# Patient Record
Sex: FEMALE | Race: WHITE | NOT HISPANIC OR LATINO | Employment: FULL TIME | URBAN - METROPOLITAN AREA
[De-identification: names, ages, dates, MRNs, and addresses within clinical notes are randomized per-mention and may not be internally consistent; named-entity substitution may affect disease eponyms.]

---

## 2017-02-17 ENCOUNTER — HOSPITAL ENCOUNTER (OUTPATIENT)
Dept: MAMMOGRAPHY | Facility: HOSPITAL | Age: 42
Discharge: HOME | End: 2017-02-17
Attending: OBSTETRICS & GYNECOLOGY
Payer: COMMERCIAL

## 2017-02-17 DIAGNOSIS — Z12.31 ENCOUNTER FOR SCREENING MAMMOGRAM FOR MALIGNANT NEOPLASM OF BREAST: ICD-10-CM

## 2017-02-17 PROCEDURE — G0202 SCR MAMMO BI INCL CAD: HCPCS

## 2017-06-03 ENCOUNTER — HOSPITAL ENCOUNTER (OUTPATIENT)
Dept: RADIOLOGY | Age: 42
Discharge: HOME | End: 2017-06-03
Admitting: FAMILY MEDICINE
Payer: COMMERCIAL

## 2017-06-03 ENCOUNTER — TRANSCRIBE ORDERS (OUTPATIENT)
Dept: URGENT CARE | Age: 42
End: 2017-06-03

## 2017-06-03 ENCOUNTER — OFFICE VISIT (OUTPATIENT)
Dept: URGENT CARE | Age: 42
End: 2017-06-03
Payer: COMMERCIAL

## 2017-06-03 DIAGNOSIS — M79.671 PAIN OF RIGHT FOOT: ICD-10-CM

## 2017-06-03 PROCEDURE — 99203 OFFICE O/P NEW LOW 30 MIN: CPT | Performed by: FAMILY MEDICINE

## 2017-06-03 PROCEDURE — 73630 X-RAY EXAM OF FOOT: CPT

## 2017-07-17 ENCOUNTER — ALLSCRIPTS OFFICE VISIT (OUTPATIENT)
Dept: OTHER | Facility: OTHER | Age: 42
End: 2017-07-17

## 2017-07-17 DIAGNOSIS — Z00.00 ENCOUNTER FOR GENERAL ADULT MEDICAL EXAMINATION WITHOUT ABNORMAL FINDINGS: ICD-10-CM

## 2017-07-17 DIAGNOSIS — I26.99 OTHER PULMONARY EMBOLISM WITHOUT ACUTE COR PULMONALE (HCC): ICD-10-CM

## 2017-07-17 DIAGNOSIS — E66.9 OBESITY: ICD-10-CM

## 2017-07-19 ENCOUNTER — APPOINTMENT (OUTPATIENT)
Dept: LAB | Age: 42
End: 2017-07-19
Payer: COMMERCIAL

## 2017-07-19 ENCOUNTER — GENERIC CONVERSION - ENCOUNTER (OUTPATIENT)
Dept: OTHER | Facility: OTHER | Age: 42
End: 2017-07-19

## 2017-07-19 ENCOUNTER — TRANSCRIBE ORDERS (OUTPATIENT)
Dept: ADMINISTRATIVE | Age: 42
End: 2017-07-19

## 2017-07-19 DIAGNOSIS — I26.99 PULMONARY INFARCTION (HCC): Primary | ICD-10-CM

## 2017-07-19 DIAGNOSIS — E66.9 OBESITY: ICD-10-CM

## 2017-07-19 DIAGNOSIS — Z00.00 ENCOUNTER FOR GENERAL ADULT MEDICAL EXAMINATION WITHOUT ABNORMAL FINDINGS: ICD-10-CM

## 2017-07-19 LAB
CHOLEST SERPL-MCNC: 233 MG/DL (ref 50–200)
GLUCOSE P FAST SERPL-MCNC: 93 MG/DL (ref 65–99)
HDLC SERPL-MCNC: 48 MG/DL (ref 40–60)
LDLC SERPL CALC-MCNC: 165 MG/DL (ref 0–100)
TRIGL SERPL-MCNC: 98 MG/DL
TSH SERPL DL<=0.05 MIU/L-ACNC: 1.23 UIU/ML (ref 0.36–3.74)

## 2017-07-19 PROCEDURE — 80061 LIPID PANEL: CPT

## 2017-07-19 PROCEDURE — 36415 COLL VENOUS BLD VENIPUNCTURE: CPT

## 2017-07-19 PROCEDURE — 84443 ASSAY THYROID STIM HORMONE: CPT

## 2017-07-19 PROCEDURE — 82947 ASSAY GLUCOSE BLOOD QUANT: CPT

## 2017-07-25 ENCOUNTER — GENERIC CONVERSION - ENCOUNTER (OUTPATIENT)
Dept: OTHER | Facility: OTHER | Age: 42
End: 2017-07-25

## 2017-07-26 ENCOUNTER — GENERIC CONVERSION - ENCOUNTER (OUTPATIENT)
Dept: OTHER | Facility: OTHER | Age: 42
End: 2017-07-26

## 2017-07-28 ENCOUNTER — APPOINTMENT (OUTPATIENT)
Dept: LAB | Facility: CLINIC | Age: 42
End: 2017-07-28
Payer: COMMERCIAL

## 2017-07-28 ENCOUNTER — GENERIC CONVERSION - ENCOUNTER (OUTPATIENT)
Dept: OTHER | Facility: OTHER | Age: 42
End: 2017-07-28

## 2017-07-28 DIAGNOSIS — I26.99 OTHER PULMONARY EMBOLISM WITHOUT ACUTE COR PULMONALE (HCC): ICD-10-CM

## 2017-07-28 LAB
ALBUMIN SERPL BCP-MCNC: 3.4 G/DL (ref 3.5–5)
ALP SERPL-CCNC: 89 U/L (ref 46–116)
ALT SERPL W P-5'-P-CCNC: 41 U/L (ref 12–78)
ANION GAP SERPL CALCULATED.3IONS-SCNC: 9 MMOL/L (ref 4–13)
AST SERPL W P-5'-P-CCNC: 30 U/L (ref 5–45)
BASOPHILS # BLD AUTO: 0.04 THOUSANDS/ΜL (ref 0–0.1)
BASOPHILS NFR BLD AUTO: 1 % (ref 0–1)
BILIRUB SERPL-MCNC: 0.28 MG/DL (ref 0.2–1)
BUN SERPL-MCNC: 12 MG/DL (ref 5–25)
CALCIUM SERPL-MCNC: 9.7 MG/DL (ref 8.3–10.1)
CHLORIDE SERPL-SCNC: 102 MMOL/L (ref 100–108)
CO2 SERPL-SCNC: 26 MMOL/L (ref 21–32)
CREAT SERPL-MCNC: 0.85 MG/DL (ref 0.6–1.3)
EOSINOPHIL # BLD AUTO: 0.23 THOUSAND/ΜL (ref 0–0.61)
EOSINOPHIL NFR BLD AUTO: 3 % (ref 0–6)
ERYTHROCYTE [DISTWIDTH] IN BLOOD BY AUTOMATED COUNT: 12.1 % (ref 11.6–15.1)
GFR SERPL CREATININE-BSD FRML MDRD: 85 ML/MIN/1.73SQ M
GLUCOSE P FAST SERPL-MCNC: 88 MG/DL (ref 65–99)
HCT VFR BLD AUTO: 41.8 % (ref 34.8–46.1)
HCYS SERPL-SCNC: 9 UMOL/L (ref 3.7–11.2)
HGB BLD-MCNC: 13.5 G/DL (ref 11.5–15.4)
LYMPHOCYTES # BLD AUTO: 1.83 THOUSANDS/ΜL (ref 0.6–4.47)
LYMPHOCYTES NFR BLD AUTO: 21 % (ref 14–44)
MCH RBC QN AUTO: 30 PG (ref 26.8–34.3)
MCHC RBC AUTO-ENTMCNC: 32.3 G/DL (ref 31.4–37.4)
MCV RBC AUTO: 93 FL (ref 82–98)
MONOCYTES # BLD AUTO: 0.62 THOUSAND/ΜL (ref 0.17–1.22)
MONOCYTES NFR BLD AUTO: 7 % (ref 4–12)
NEUTROPHILS # BLD AUTO: 6.07 THOUSANDS/ΜL (ref 1.85–7.62)
NEUTS SEG NFR BLD AUTO: 68 % (ref 43–75)
NRBC BLD AUTO-RTO: 0 /100 WBCS
PLATELET # BLD AUTO: 501 THOUSANDS/UL (ref 149–390)
PMV BLD AUTO: 9.4 FL (ref 8.9–12.7)
POTASSIUM SERPL-SCNC: 3.9 MMOL/L (ref 3.5–5.3)
PROT SERPL-MCNC: 8.5 G/DL (ref 6.4–8.2)
RBC # BLD AUTO: 4.5 MILLION/UL (ref 3.81–5.12)
SODIUM SERPL-SCNC: 137 MMOL/L (ref 136–145)
WBC # BLD AUTO: 8.81 THOUSAND/UL (ref 4.31–10.16)

## 2017-07-28 PROCEDURE — 86147 CARDIOLIPIN ANTIBODY EA IG: CPT

## 2017-07-28 PROCEDURE — 81241 F5 GENE: CPT

## 2017-07-28 PROCEDURE — 81240 F2 GENE: CPT

## 2017-07-28 PROCEDURE — 83090 ASSAY OF HOMOCYSTEINE: CPT

## 2017-07-28 PROCEDURE — 85025 COMPLETE CBC W/AUTO DIFF WBC: CPT

## 2017-07-28 PROCEDURE — 80053 COMPREHEN METABOLIC PANEL: CPT

## 2017-07-28 PROCEDURE — 36415 COLL VENOUS BLD VENIPUNCTURE: CPT

## 2017-07-28 PROCEDURE — 85301 ANTITHROMBIN III ANTIGEN: CPT

## 2017-07-28 PROCEDURE — 85303 CLOT INHIBIT PROT C ACTIVITY: CPT

## 2017-07-28 PROCEDURE — 85306 CLOT INHIBIT PROT S FREE: CPT

## 2017-07-29 LAB
CARDIOLIPIN IGA SER IA-ACNC: <9 APL U/ML (ref 0–11)
CARDIOLIPIN IGG SER IA-ACNC: <9 GPL U/ML (ref 0–14)
CARDIOLIPIN IGM SER IA-ACNC: 9 MPL U/ML (ref 0–12)

## 2017-07-31 LAB
AT III AG ACT/NOR PPP IA: 83 % (ref 72–124)
PROT C AG ACT/NOR PPP IA: >150 % OF NORMAL (ref 60–150)
PROT S ACT/NOR PPP: >259 % (ref 63–140)

## 2017-08-03 LAB
COMMENT: ABNORMAL
F5 GENE MUT ANL BLD/T: ABNORMAL

## 2017-08-04 ENCOUNTER — GENERIC CONVERSION - ENCOUNTER (OUTPATIENT)
Dept: OTHER | Facility: OTHER | Age: 42
End: 2017-08-04

## 2017-08-04 LAB
F2 GENE MUT ANL BLD/T: NORMAL
Lab: NORMAL

## 2017-08-11 ENCOUNTER — ALLSCRIPTS OFFICE VISIT (OUTPATIENT)
Dept: OTHER | Facility: OTHER | Age: 42
End: 2017-08-11

## 2017-10-09 ENCOUNTER — APPOINTMENT (OUTPATIENT)
Dept: LAB | Facility: CLINIC | Age: 42
End: 2017-10-09
Payer: COMMERCIAL

## 2017-10-09 DIAGNOSIS — I26.99 OTHER PULMONARY EMBOLISM WITHOUT ACUTE COR PULMONALE (HCC): ICD-10-CM

## 2017-10-09 LAB
ANION GAP SERPL CALCULATED.3IONS-SCNC: 6 MMOL/L (ref 4–13)
BASOPHILS # BLD AUTO: 0.04 THOUSANDS/ΜL (ref 0–0.1)
BASOPHILS NFR BLD AUTO: 1 % (ref 0–1)
BUN SERPL-MCNC: 11 MG/DL (ref 5–25)
CALCIUM SERPL-MCNC: 9.3 MG/DL (ref 8.3–10.1)
CHLORIDE SERPL-SCNC: 104 MMOL/L (ref 100–108)
CO2 SERPL-SCNC: 28 MMOL/L (ref 21–32)
CREAT SERPL-MCNC: 0.77 MG/DL (ref 0.6–1.3)
EOSINOPHIL # BLD AUTO: 0.32 THOUSAND/ΜL (ref 0–0.61)
EOSINOPHIL NFR BLD AUTO: 4 % (ref 0–6)
ERYTHROCYTE [DISTWIDTH] IN BLOOD BY AUTOMATED COUNT: 13.4 % (ref 11.6–15.1)
GFR SERPL CREATININE-BSD FRML MDRD: 96 ML/MIN/1.73SQ M
GLUCOSE P FAST SERPL-MCNC: 101 MG/DL (ref 65–99)
HCT VFR BLD AUTO: 41 % (ref 34.8–46.1)
HGB BLD-MCNC: 13.4 G/DL (ref 11.5–15.4)
LYMPHOCYTES # BLD AUTO: 2.34 THOUSANDS/ΜL (ref 0.6–4.47)
LYMPHOCYTES NFR BLD AUTO: 30 % (ref 14–44)
MCH RBC QN AUTO: 29.6 PG (ref 26.8–34.3)
MCHC RBC AUTO-ENTMCNC: 32.7 G/DL (ref 31.4–37.4)
MCV RBC AUTO: 91 FL (ref 82–98)
MONOCYTES # BLD AUTO: 0.74 THOUSAND/ΜL (ref 0.17–1.22)
MONOCYTES NFR BLD AUTO: 10 % (ref 4–12)
NEUTROPHILS # BLD AUTO: 4.33 THOUSANDS/ΜL (ref 1.85–7.62)
NEUTS SEG NFR BLD AUTO: 55 % (ref 43–75)
PLATELET # BLD AUTO: 337 THOUSANDS/UL (ref 149–390)
PMV BLD AUTO: 10.1 FL (ref 8.9–12.7)
POTASSIUM SERPL-SCNC: 4.3 MMOL/L (ref 3.5–5.3)
RBC # BLD AUTO: 4.53 MILLION/UL (ref 3.81–5.12)
SODIUM SERPL-SCNC: 138 MMOL/L (ref 136–145)
WBC # BLD AUTO: 7.77 THOUSAND/UL (ref 4.31–10.16)

## 2017-10-09 PROCEDURE — 36415 COLL VENOUS BLD VENIPUNCTURE: CPT

## 2017-10-09 PROCEDURE — 85025 COMPLETE CBC W/AUTO DIFF WBC: CPT

## 2017-10-09 PROCEDURE — 80048 BASIC METABOLIC PNL TOTAL CA: CPT

## 2017-10-20 ENCOUNTER — ALLSCRIPTS OFFICE VISIT (OUTPATIENT)
Dept: OTHER | Facility: OTHER | Age: 42
End: 2017-10-20

## 2017-10-21 NOTE — PROGRESS NOTES
Assessment  1  Factor V Leiden mutation (289 81) (D68 51)   2  Pulmonary embolism (415 19) (I26 99)    Plan  DM (diabetes mellitus screen), Factor V Leiden mutation, Obesity (BMI 30-39 9), Pulmonary  embolism, Screening for hyperlipidemia    · (1) CBC/PLT/DIFF; Status:Active; Requested for:93Iex3715;    · (1) COMPREHENSIVE METABOLIC PANEL; Status:Active; Requested for:85Nwt2620;    · (1) LIPID PANEL FASTING W DIRECT LDL REFLEX; Status:Active; Requested for:92Ukt2368; Discussion/Summary  Discussion Summary:   Doing well on Xareltoin July for wellness  Counseling Documentation With Imm: The patient was counseled regarding impressions  Medication SE Review and Pt Understands Tx: Possible side effects of new medications were reviewed with the patient/guardian today  The treatment plan was reviewed with the patient/guardian  The patient/guardian understands and agrees with the treatment plan      Chief Complaint  Chief Complaint Free Text Note Form: The patient presents today to discuss follow up on [address] [place] [address] [place]: Patient is here today for follow up of chronic conditions described in HPI  History of Present Illness  HPI: Factor V diagnosed when she had a PE in [address] [place] [address] on Xareltobruisingseem to be slightly heavier and a day longeroccasionally pain in the left back but not needing the muscle relaxantcbc bmp normal       Review of Systems  Complete-Female:   Constitutional: no fever-- and-- no chills  Cardiovascular: no chest pain-- and-- no palpitations  Respiratory: no shortness of breath-- and-- no cough  Gastrointestinal: no abdominal pain,-- no constipation,-- no diarrhea-- and-- no blood in stools  Integumentary: scaly patch on the left elbow  Active Problems  1  Bee sting allergy (V15 06) (Z91 038)   2  History of Bereavement (V62 82) (Z63 4)   3  DM (diabetes mellitus screen) (V77 1) (Z13 1)   4   Encounter for gynecological examination without abnormal finding (V72 31) (Z01 419)   5  Encounter for routine gynecological examination (V72 31) (Z01 419)   6  Encounter for screening mammogram for malignant neoplasm of breast (V76 12) (Z12 31)   7  Factor V Leiden mutation (289 81) (D68 51)   8  Laboratory examination ordered as part of a routine general medical examination (V72 62) (Z00 00)   9  History of Marital conflict (Z70 82) (J13 5)   10  Obesity (BMI 30-39 9) (278 00) (E66 9)   11  History of Oral contraceptive prescribed (V25 01) (Z30 011)   12  Pulmonary embolism (415 19) (I26 99)   13  Screening for human papillomavirus (HPV) (V73 81) (Z11 51)   14  Screening for hyperlipidemia (V77 91) (E14 254)    Past Medical History  1  History of , threatened, early pregnancy (640 03) (O20 0)   2  History of Acute tonsillitis (463) (J03 90)   3  History of Age At First Period 15 Years Old (Menarche)   4  History of Embryonic demise (632) (O02 1)   5  History of acute pharyngitis (V12 69) (Z87 09)   6  History of fever (V13 89) (Z87 898)   7  History of migraine (V12 49) (Z86 69)   8  History of Major depressive disorder, single episode, moderate with peripartum onset (296 22)   (F32 1)   9  Normal delivery (650) (O80,Z37 9)   10  History of Oral contraceptive prescribed (V25 01) (Z30 011)   11  History of Pain in right foot (729 5) (M79 671)   12  History of Type B influenza (487 1) (J10 1)  Active Problems And Past Medical History Reviewed: The active problems and past medical history were reviewed and updated today  Surgical History  1  Denied: History Of Prior Surgery  Surgical History Reviewed: The surgical history was reviewed and updated today  Family History  Mother    1  Family history of Hypertension (V17 49)  Father    2  Family history of Colon Cancer (V16 0)   3  Family history of Colon Cancer (V16 0)  Maternal Grandfather    4  Family history of Diabetes Mellitus (V18 0)  Family History    5   Family history of Colon Cancer (V16 0) 6  Family history of Heart Disease (V17 49)  Family History Reviewed: The family history was reviewed and updated today  Social History   · Being A Social Drinker   · History of Bereavement (V62 82) (Z63 4)   · Daily Tea Consumption (3  Cups/Day)   · Denied: History of Drug Use   · History of Marital conflict (G33 85) (M67 9)   · Never A Smoker  Social History Reviewed: The social history was reviewed and updated today  Current Meds   1  EpiPen 2-Adama 0 3 MG/0 3ML Injection Solution Auto-injector; 0 3mg IM in thigh prn anaphylaxis call   911; Therapy: 74SVV6440 to (Evaluate:30Apr2016)  Requested for: 28Jbt6517; Last Rx:75Wqd7383   Ordered   2  Xarelto 20 MG Oral Tablet; take 1 tablet by mouth every day; Therapy: 47AIY2269 to (Last Rx:79Tfg2544)  Requested for: 61Dkj5721 Ordered  Medication List Reviewed: The medication list was reviewed and updated today  Allergies  1  No Known Drug Allergies  2  Bee sting    Vitals  Vital Signs    Recorded: 38KOA7022 10:12AM   Temperature 98 4 F, Oral   Heart Rate 64   Respiration 18   Systolic 232, Sitting   Diastolic 80, Sitting   O2 Saturation 99     Physical Exam    Constitutional   General appearance: No acute distress, well appearing and well nourished  obese  Eyes   Conjunctiva and lids: No swelling, erythema or discharge  Ears, Nose, Mouth, and Throat   Otoscopic examination: Tympanic membranes translucent with normal light reflex  Canals patent without erythema  Oropharynx: Normal with no erythema, edema, exudate or lesions  Pulmonary   Respiratory effort: No increased work of breathing or signs of respiratory distress  Auscultation of lungs: Clear to auscultation  Cardiovascular   Auscultation of heart: Normal rate and rhythm, normal S1 and S2, without murmurs  Abdomen   Abdomen: Non-tender, no masses  Liver and spleen: No hepatomegaly or splenomegaly  Lymphatic   Palpation of lymph nodes in neck: No lymphadenopathy  Musculoskeletal   Gait and station: Normal     Psychiatric   Orientation to person, place, and time: Normal     Mood and affect: Normal          Results/Data  PHQ-2 Adult Depression Screening 20Oct2017 10:13AM User, Ahs     Test Name Result Flag Reference   PHQ-2 Adult Depression Score 0     Over the last two weeks, how often have you been bothered by any of the following problems? Little interest or pleasure in doing things: Not at all - 0  Feeling down, depressed, or hopeless: Not at all - 0   PHQ-2 Adult Depression Screening Negative         Health Management  Screening for human papillomavirus (HPV)   (1) THIN PREP PAP WITH IMAGING; every 3 years; Last 99Xeh2764; Next Due: 88Jfo0519; Overdue    Future Appointments    Date/Time Provider Specialty Site   11/10/2017 10:00 AM Shalini Mar DO Obstetrics/Gynecology [facility]   07/19/2018 10:00 AM LETTY Hutson  Internal Medicine [facility]   08/03/2018 10:30 AM LETTY Hutson   Internal Medicine [facility]     Signatures   Electronically signed by : LETTY Ruvalcaba ; Oct 20 2017 10:29AM EST                       (Author)

## 2017-11-10 ENCOUNTER — GENERIC CONVERSION - ENCOUNTER (OUTPATIENT)
Dept: OTHER | Facility: OTHER | Age: 42
End: 2017-11-10

## 2018-01-09 NOTE — RESULT NOTES
Verified Results  (1) THROAT CULTURE (CULTURE, UPPER RESPIRATORY) 47UMS5582 01:17PM Billy Lesser Order Number: [id number]     Test Name Result Flag Reference   CLINICAL REPORT (Report)     Test:        Throat culture  Specimen Source:  Throat  Specimen Type:   Throat  Specimen Date:   5/23/2016 1:17 PM  Result Date:    5/24/2016 2:53 PM  Result Status:   Final result  Resulting Lab:   [place] [address]            Tel: [phone]      CULTURE                                       ------------------                                   3+ Growth of Beta Hemolytic Streptococcus Group G     *** This organism is intrinisically susceptible to Penicillin  If sensitivites to other antibiotics are required, please call the      Microbiology Department at [phone] within 5 days  ***       Discussion/Summary   Discussed results with patent  She is improving  Advised her to continue the amoxicillin until completion  She states understanding

## 2018-01-11 NOTE — PSYCH
Progress Note  Psychotherapy Provided [facility]: Family Therapy provided today  Goals addressed in session:   Goal #1   D: Paulette: " I was talking to my doctor"   "I think often of the loss of my baby"   (crying)   " If I had my strongest desire, it would be to have another baby "  Lajean Cassette   " One of my doctors told me his wife had a baby at age 43, everything was fine, and that gave me some hope   but I know my  Ace Harrison, (here with her), could not agree with my wishes   that is hopeless "  Lajean Cassette  "I have low energy, I don't have any interest in doing the housework, because 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, 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 Overtime, too, I work 11am to 11pm  I procrastinate  Lajean Cassette Lajean Cassette I only put a few Michael decorations up ,I didn't feel like it, I was sad about losing the baby last year  "    Alli: " I do feel for her, and I'm trying to be supportive of her, but we haven't directly talked about getting any permanent surgery, either vasectomy for me, or tubal for her, and I do want to talk about things"   " I get my own depression and irritability"    "No , I'm not on meds "  Lajean Cassette  " And I worry very much, about our finances   we are slowly paying off some debts, and I never want to be in the hole we were in , again  "  Lajean Cassette " I'm trying to pay off a second mortgage, a home equity loan, and credit card bills "   " We have 3 sons, ages 15, 8, and 9, and we both love to take care of them and do things with them---we all really enjoyed going to [place] [address], and we also took the kids to [facility]---they loved it "  Lajean Cassette   " I couldn't afford that,with another baby "    Pt (Paulette) and  (Andrei Mata) participate in a Couples Therapy session  Pt has a sad , anxious, depressed, tearful affect  Mood is slightly depressed, only a 5 on the PHQ-9 scale  Pt denies Altagracia Sports / Westford Soren / Adilene Creamer / VH   Ace Harrison has mild depression ,also, and he also has no evidence of SI / HI / Adilene Creamer / VH    gets irritability at home, they both acknowledge  Pt gets bouts of tearfulness, often  Pt Paulette is on Lexapro 10mg---I highly recommend she speak to her Family Physician re; increased dose to 20 mg a day  I encourage  Louisa Macdonald to speak to HIS doctor also, about antidepressant medication  psychoeducation is given  They verbalize and process their thoughts, feelings, and behaviors  They process their grief and loss, re: pt's miscarriage on March 19, 2015  Grief Therapy, validation, and support given  Pt loves her  and 3 boys, but she would love to have a baby daughter, or even another baby son, before she has menopause   says "no", as the financial and emotional stress and worry on him would be too much, and he wants to comfortably provide for the 3 children they have,without overwhelming debt in the future, re; the childrens' activities, college educations, and the couple's own needs  Pt and  have only rare sex, as  is afraid his wife will get pregnant unexpectedly  Birth control is sketchy  We review the pros and cons of their having another baby  Their homework is to make a List of the Pros and the Cons , of having another baby, and they will bring with them to next session  They are holding hands , and sitting closely together, in session, and they do have good eye contact with each other  They will continue to talk to each other at home, and show mutual love and respect  Affirmation is given to both of them by this Therapist, for the family they are raising right now, and they will use mindfulness/ deep breathing, and affirmations, each day  Pt to try to limit the Overtime hours, as it is getting too exhausting for her, and  has dinner/ nighttime/ bedtime duty with their 3 sons most every night, except every-other weekend---this is very tiring for him, also  A: Major Depressive Disorder, single episode, with Neisha partum onset;  Bereavement, residual; and Marital conflict re: the decision to have another child or not    P: Continue Psychotherapy, Couples therapy, communications, positive coping skills, and they will develop a list of Pros and Cons re: Having Another Baby, and bring the list to next session in 2 weeks  Pain Scale and Suicide Risk [facility]: Current Pain Assessment: no pain   On a scale of 0 to 10, the patient rates current pain at 0   Current suicide risk is low   Behavioral Health Treatment Plan [facility]: Diagnosis and Treatment Plan explained to patient, patient relates understanding diagnosis and is agreeable to Treatment Plan  Assessment    1  Major depressive disorder, single episode, moderate with peripartum onset (296 22)   (F32 1)   2   Marital conflict (Q77 55) (Y56 2)    Signatures   Electronically signed by : Nneka Benton MSAPRNPMHCNS-BC; Ramin 15 2016  9:38PM EST                       (Author)    Electronically signed by : SERGEY KingMHCNS-BC; Ramin 15 2016  9:39PM EST                       (Author)

## 2018-01-11 NOTE — RESULT NOTES
Verified Results  (1) LIPID PANEL FASTING W DIRECT LDL REFLEX [id number] 10:57AM [employer] Order Number: [id number]     Test Name Result Flag Reference   CHOLESTEROL 233 mg/dL H    LDL CHOLESTEROL CALCULATED 165 mg/dL H 0-100   Triglyceride:         Normal              <150 mg/dl       Borderline High    150-199 mg/dl       High               200-499 mg/dl       Very High          >499 mg/dl  Cholesterol:         Desirable        <200 mg/dl      Borderline High  200-239 mg/dl      High             >239 mg/dl  HDL Cholesterol:        High    >59 mg/dL      Low     <41 mg/dL  LDL Cholesterol:        Optimal          <100 mg/dl        Near Optimal     100-129 mg/dl        Above Optimal          Borderline High   130-159 mg/dl          High              160-189 mg/dl          Very High        >189 mg/dl  LDL CALCULATED:    This screening LDL is a calculated result  It does not have the accuracy of the Direct Measured LDL in the monitoring of patients with hyperlipidemia and/or statin therapy  Direct Measure LDL (NOQ874) must be ordered separately in these patients  TRIGLYCERIDES 98 mg/dL  <=150   Specimen collection should occur prior to N-Acetylcysteine or Metamizole administration due to the potential for falsely depressed results  HDL,DIRECT 48 mg/dL  40-60   Specimen collection should occur prior to Metamizole administration due to the potential for falsely depressed results  (1) TSH WITH FT4 REFLEX 60RBW6533 10:57AM [employer] Order Number: [id number]     Test Name Result Flag Reference   TSH 1 230 uIU/mL  0 358-3 740   Patients undergoing fluorescein dye angiography may retain small amounts of fluorescein in the body for 48-72 hours post procedure  Samples containing fluorescein can produce falsely depressed TSH values  If the patient had this procedure,a specimen should be resubmitted post fluorescein clearance            The recommended reference ranges for TSH during pregnancy are as follows:  First trimester 0 1 to 2 5 uIU/mL  Second trimester  0 2 to 3 0 uIU/mL  Third trimester 0 3 to 3 0 uIU/m     (1) GLUCOSE,  FASTING 95YHC1933 10:57AM Modesta Mrak Order Number: [id number]     Test Name Result Flag Reference   GLUCOSE FASTING 93 mg/dL  65-99       Discussion/Summary   Paulette, Your cholesterol rohit significantly from last year  The LDL/bad cholesterol is 165 from 127, ideally under 130  Please watch your diet/cholesterol/fat intake        Signatures   Electronically signed by : LETTY Moran ; Jul 19 2017  5:05PM EST                       (Author)

## 2018-01-12 VITALS
HEART RATE: 64 BPM | OXYGEN SATURATION: 99 % | SYSTOLIC BLOOD PRESSURE: 118 MMHG | DIASTOLIC BLOOD PRESSURE: 80 MMHG | TEMPERATURE: 98.4 F | RESPIRATION RATE: 18 BRPM

## 2018-01-12 NOTE — PSYCH
Treatment Plan Tracking    #1 Treatment Plan not completed within required time limits due to: Client presented with emotional/behavioral issues that required clinical intervention  , Other: Couples Therapy/ Miscarriage/ Decision-making             Signatures   Electronically signed by : ERASMO Breen; Ramin 15 2016  9:45PM EST                       (Author)

## 2018-01-13 VITALS
WEIGHT: 197 LBS | RESPIRATION RATE: 20 BRPM | TEMPERATURE: 98.2 F | SYSTOLIC BLOOD PRESSURE: 112 MMHG | HEIGHT: 62 IN | BODY MASS INDEX: 36.25 KG/M2 | HEART RATE: 88 BPM | DIASTOLIC BLOOD PRESSURE: 78 MMHG | OXYGEN SATURATION: 98 %

## 2018-01-15 NOTE — RESULT NOTES
Message   The blood test has shown a factor 5 Leiden mutation which places her at risk for blood clots   The rest of the labs have been normal   I'll see her next week as scheduled and discuss this further with her        Verified Results  (1) FACTOR V LEIDEN MUTATION DNA ANALYSIS 35Cet3091 12:11PM Cherelle Ely Order Number: [id number]     Test Name Result Flag Reference   FACTOR V LEIDEN Comment A    RESULT: SINGLE R506Q MUTATION IDENTIFIED (HETEROZYGOTE)  Factor V Leiden is a specific mutation (R506Q) in the factor V gene  that is associated with an increased risk of venous thrombosis  Factor V Leiden is more resistant to inactivation by activated protein  C  As a result, factor V persists in the circulation leading to a  mild hypercoagulable state  Factor V Leiden has been reported in  patients with deep vein thrombosis, pulmonary embolus, central retinal  vein occulsion, cerebral sinus thrombosis, and hepatic vein thrombosis  The relative risk of venous thrombosis is increased approximately 4-8  fold in individuals who are heterozygous  About 3-8% of the general  [place] and  population are heterozygous  The risk of venous  thrombosis increases exponentially in patients with more than one risk  factor, including:  age, surgery, oral contraceptive use, pregnancy,  elevated homocysteine levels, or a Factor II/prothrombin mutation  (W97838P)  Additionally, for individuals found to be heterozygous for  the Factor V Leiden mutation, presence of a second mutation,  Factor V R2, further increases the risk if venous thrombosis  Contact  [facility] Customer Service at [phone] for further  information on both the Factor II (Prothrombin) DNA Analysis, and  Factor V R2 DNA Analysis tests  COMMENT Comment     **Genetic counselors are available for health care providers to**    discuss results at [phone]    Methodology:  DNA analysis of the Factor V gene was performed by allele-specific  PCR  The diagnostic sensitivity and specificity is >99% for both  Molecular-based testing is highly accurate, but as in any laboratory  test, diagnostic errors may occur  All test results must be combined  with clinical information for the most accurate interpretation  This test was developed and its performance characteristics determined  by [facility]  It has not been cleared or approved by the Food and Drug  Administration  References:  Yanira SHIELDS (1996)  Clin Lab Med 38:255-539  Kristen Man, PhD, Angelia Ibanez, PhD, Patricia Tovar, PhD, LETTY Barrera , PhD, Rhina Freeman, PhD, Sally Burnham, PhD, Shikha Carey PhD, [facility] [place]     Performed at:  [facility] [place], [place]  [id number]  : Kory Nguyen MD, Phone:  [phone] (1) [address] ANALYSIS [id number] 12:11PM Neeta Board Order Number: [id number]     Test Name Result Flag Reference   PROTHROMBIN A43638I MUTATION Comment     NEGATIVE  No mutation identified  Comment:  A point mutation (H09542B) in the factor II (prothrombin) gene is the  second most common cause of inherited thrombophilia  The incidence of  this mutation in the [place]   population is about 2% and in the  [place] population it is approximately 0 5%  This mutation is  rare in the [place] and  population  Being heterozygous  for a prothrombin mutation increases the risk for developing venous  thrombosis about 2 to 3 times above the general population risk  Being  homozygous for the prothrombin gene mutation increases the relative  risk for venous thrombosis further, although it is not yet known how  much further the risk is increased   In women heterozygous for the  prothrombin gene mutation, the use of estrogen containing oral  contraceptives increases the relative risk of venous thrombosis about  16 times and the risk of developing cerebral thrombosis is also  significantly increased  In pregnancy the prothrombin gene mutation  increases risk for venous thrombosis and may increase risk for  stillbirth, placental abruption, pre-eclampsia and fetal growth  restriction  If the patient possesses two or more congenital or  acquired thrombophilic risk factors, the risk for thrombosis may rise  to more than the sum of the risk ratios for the individual mutations  This assay detects only the prothrombin F12163Y mutation and does  not measure genetic abnormalities elsewhere in the genome  Other  thrombotic risk factors may be pursued through systematic clinical  laboratory analysis  These factors include the R506Q (Leiden)  mutation in the Factor V gene, plasma homocysteine levels, as well  as testing for deficiencies of antithrombin III, protein C and  protein S    ADDITIONAL INFO Comment     Genetic Counselors are available for health care providers  to discuss results at [phone])  Methodology:  DNA analysis of the Factor II gene was performed by PCR  amplification followed by restriction analysis  The  diagnostic sensitivity is >99% for both  All the tests must  be combined with clinical information for the most accurate  interpretation  Molecular-based testing is highly accurate,  but as in any laboratory test, diagnostic errors may occur  This test was developed and its performance characteristics  determined by [facility]  It has not been cleared or approved  by the Food and Drug Administration  Shiv SR, et al  Blood  1996; 08:6508-8493  Maeve Ash EA  Circulation  2004; 967:W12-E43  Michel Brittle, et [address];  19:700-703    Earle Rosas, PhD, Angela Gardiner, PhD, Lucho Ray, PhD, LETTY Chacon , PhD, Trudy Hancock, PhD, Sae Garcia, PhD, Adis Vee, PhD, [facility] [place]     Performed at:  [facility] [address]  [facility] [address] [place], [place]  [id number]  : Hemanth Collins MD, Phone:  [phone]       Signatures   Electronically signed by : LETTY Eastman ; Aug  4 2017 11:53AM EST                       (Author)

## 2018-01-15 NOTE — MISCELLANEOUS
Assessment    1  Pulmonary embolism (415 19) (I26 99)   2  History of Oral contraceptive prescribed (V25 01) (Z30 011)   3  Obesity (BMI 30-39 9) (278 00) (E66 9)    Plan  Pulmonary embolism    · (1) ANTICARDIOLIPIN ANTIBODY; Status:Active; Requested for:28Jul2017;    Perform:[facility]; Due:28Jul2018; Ordered; For:Pulmonary embolism; Ordered By:Reyes, Lea;   · (1) ANTITHROMBIN III ANTIGEN; Status:Active; Requested for:28Jul2017;    Perform:[facility]; Due:38Gdx8721; Ordered; For:Pulmonary embolism; Ordered By:Reyes, Lea;   · (1) CBC/PLT/DIFF; Status:Active; Requested for:28Jul2017;    Perform:[facility]; Due:28Jul2018; Ordered; For:Pulmonary embolism; Ordered By:Reyes, Lea;   · (1) COMPREHENSIVE METABOLIC PANEL; Status:Active; Requested for:28Jul2017;    Perform:[facility]; Due:86Ozz5508; Ordered; For:Pulmonary embolism; Ordered By:Reyes, Lea;   · (1) FACTOR V LEIDEN MUTATION DNA ANALYSIS; Status:Active; Requested  for:28Jul2017;    Perform:[facility]; Due:28Jul2018; Ordered; For:Pulmonary embolism; Ordered By:Reyes, Lea;   · (1) HOMOCYSTEINE; Status:Active; Requested for:28Jul2017;    Perform:[facility]; Due:28Jul2018; Ordered; For:Pulmonary embolism; Ordered By:Reyes, Lea;   · (1) PROTEIN C ACTIVITY; Status:Active; Requested for:28Jul2017;    Perform:[facility]; Due:40Gcv9953; Ordered; For:Pulmonary embolism; Ordered By:Reyes, Lea;   · (1) PROTEIN S ACTIVITY; Status:Active; Requested for:28Jul2017;    Perform:[facility]; Due:44Egr2104; Ordered; For:Pulmonary embolism; Ordered By:Reyes, Lea;   · (1) PROTHROMBIN 49264OU MUTATION ANALYSIS; Status:Active; Requested  for:28Jul2017;    Perform:[facility]; Due:28Jul2018; Ordered; For:Pulmonary embolism; Ordered By:Reyes, Lea;   · (Q) ANTIPHOSPHOLIPID ANTIBODY PANEL; Status:Active; Requested for:28Jul2017;    Perform:[facility]; Due:28Jul2018; Ordered;   For:Pulmonary embolism; Ordered By:Reyes, Lea;    Discussion/Summary  Discussion Summary:   PE is a young patient, risk factors include OCP use and obesity  We discussed at least 6 months of anticoagulation  Hypercoagulable work up ordered  RTO 2-4 weeks  FMLA form completed -return to work in approx 6 weeks       Chief Complaint  Chief Complaint Free Text Note Form: TCM      History of Present Illness  TCM Communication  Luke: The patient is being contacted for follow-up after hospitalization  Hospital records were reviewed  She was hospitalized at [facility]  The date of admission: 2017, date of discharge: 2017  Diagnosis: Pulmonary thromboembolism  She was discharged to home  Medications were not reviewed today  She scheduled a follow up appointment  Upper Shoulder and chest pain which is improving Counseling was provided to the patient  Communication performed and completed by Melody Cerda   HPI: Here with her   She was doing well until this past weekend when she started with left shoulder pain and worsening SOB  She is a nurse and at work, symptoms worsened, pain on the left side of her chest along axillary line with breathing  She was taken via ambulance to the ER  Vitals were stable except for tachycardia  Xray showed left lower lobe pneumonia and chest CT showed bilateral PE without right heart strain, moderate clot burden, left pulmonary infarct, small left pleural effusion  She was taken off her OCP  She was placed on Xarelto  Discharged on   Continues to have the left shoulder pain and SOB with exertion  She does have the CP with breathing  Pain overall is better   She tried the Robaxin to help her sleep and it did help    +pinkish sputum when she coughs  no fever, chills  +regular BMs without blood  She started with vaginal bleeding after the OCP was stopped and currently feels like a regular period but slightly heavier  No known family h/o clots but a grandfather  in his 45s  She had one miscarriage 2 years ago  She has 3 sons  Review of Systems  Complete-Female:   Constitutional: no fever and no chills  Cardiovascular: as noted in HPI  Respiratory: as noted in HPI  Gastrointestinal: as noted in HPI  Genitourinary: as noted in HPI  Active Problems    1  Bee sting allergy (V15 06) (Z91 038)   2  History of Bereavement (V62 82) (Z63 4)   3  DM (diabetes mellitus screen) (V77 1) (Z13 1)   4  Encounter for gynecological examination without abnormal finding (V72 31) (Z01 419)   5  Encounter for routine gynecological examination (V72 31) (Z01 419)   6  Encounter for screening mammogram for malignant neoplasm of breast (V76 12)   (Z12 31)   7  Laboratory examination ordered as part of a routine general medical examination   (V72 62) (Z00 00)   8  History of Marital conflict (H39 52) (I44 1)   9  Obesity (BMI 30-39 9) (278 00) (E66 9)   10  History of Oral contraceptive prescribed (V25 01) (Z30 011)   11  Screening for human papillomavirus (HPV) (V73 81) (Z11 51)   12  Screening for hyperlipidemia (V77 91) (S81 258)    Past Medical History    1  History of , threatened, early pregnancy (640 03) (O20 0)   2  History of Acute tonsillitis (463) (J03 90)   3  History of Age At First Period 15 Years Old (Menarche)   4  History of Embryonic demise (632) (O02 1)   5  History of acute pharyngitis (V12 69) (Z87 09)   6  History of fever (V13 89) (Z87 898)   7  History of migraine (V12 49) (Z86 69)   8  History of Major depressive disorder, single episode, moderate with peripartum onset   (296 22) (F32 1)   9  Normal delivery (650) (O80,Z37 9)   10  History of Oral contraceptive prescribed (V25 01) (Z30 011)   11  History of Pain in right foot (729 5) (M79 671)   12  History of Type B influenza (487 1) (J10 1)    Surgical History    1  Denied: History Of Prior Surgery  Surgical History Reviewed: The surgical history was reviewed and updated today         Family History  Mother    1  Family history of Hypertension (V17 49)  Father    2  Family history of Colon Cancer (V16 0)   3  Family history of Colon Cancer (V16 0)  Maternal Grandfather    4  Family history of Diabetes Mellitus (V18 0)  Family History    5  Family history of Colon Cancer (V16 0)   6  Family history of Heart Disease (V17 49)  Family History Reviewed: The family history was reviewed and updated today  Social History    · Being A Social Drinker   · History of Bereavement (V62 82) (Z63 4)   · Daily Tea Consumption (3  Cups/Day)   · Denied: History of Drug Use   · History of Marital conflict (L40 93) (J86 1)   · Never A Smoker  Social History Reviewed: The social history was reviewed and updated today  Current Meds   1  EpiPen 2-Adama 0 3 MG/0 3ML Injection Solution Auto-injector; 0 3mg IM in thigh prn   anaphylaxis call 911; Therapy: 64YCO8344 to (Evaluate:30Apr2016)  Requested for: 30Apr2016; Last   Rx:30Apr2016 Ordered   2  Methocarbamol 500 MG Oral Tablet; TAKE 2 TABLETS 4 TIMES DAILY PRN; Therapy: 46TKU7833 to Recorded   3  Xarelto 15 MG Oral Tablet; 1 tab PO BID x 20days then 20mg daily; Therapy: 19PNC4100 to Recorded  Medication List Reviewed: The medication list was reviewed and updated today  Allergies    1  No Known Drug Allergies    2  Bee sting    Physical Exam    Constitutional   General appearance: No acute distress, well appearing and well nourished  Ears, Nose, Mouth, and Throat   Otoscopic examination: Tympanic membranes translucent with normal light reflex  Canals patent without erythema  Oropharynx: Normal with no erythema, edema, exudate or lesions  Pulmonary   Respiratory effort: No increased work of breathing or signs of respiratory distress  Auscultation of lungs: Clear to auscultation  Cardiovascular   Auscultation of heart: Normal rate and rhythm, normal S1 and S2, without murmurs      Examination of extremities for edema and/or varicosities: Normal     Abdomen   Abdomen: Non-tender, no masses  Lymphatic   Palpation of lymph nodes in neck: No lymphadenopathy  Musculoskeletal   Gait and station: Normal     Psychiatric   Orientation to person, place, and time: Normal     Mood and affect: Normal          Health Management  Screening for human papillomavirus (HPV)   (1) THIN PREP PAP WITH IMAGING; every 3 years; Last 12Apr2012; Next Due: 00Okd0746; Overdue    Future Appointments    Date/Time Provider Specialty Site   11/10/2017 10:00 AM James Francois DO Obstetrics/Gynecology [facility] \[facility]\""   07/19/2018 10:00 AM LETTY Lambert   Internal Medicine [facility]     Signatures   Electronically signed by : Mert Timmons, ; Jul 26 2017  1:06PM EST                       (Author)    Electronically signed by : LETTY Luther ; Jul 28 2017 12:25PM EST                       (Author)

## 2018-01-16 NOTE — PROGRESS NOTES
Assessment    1  Encounter for preventive health examination (V70 0) (Z00 00)    Plan  Laboratory examination ordered as part of a routine general medical examination,  Obesity (BMI 30-39  9)    · (1) GLUCOSE,  FASTING; Status:Active; Requested for:23Mpz0581;    · (1) LIPID PANEL FASTING W DIRECT LDL REFLEX; Status:Active; Requested  for:89Ics1829;    · (1) TSH WITH FT4 REFLEX; Status:Active; Requested for:37Qmb0605;   Obesity (BMI 30-39  9)    · 1 Karry Cockayne MD, Margot Avila (Internal Medicine) Co-Management  *  Status: Active  Requested  for: [address] Summary provided  : Yes    Discussion/Summary  health maintenance visit Currently, she eats a poor diet and has an inadequate exercise regimen  cervical cancer screening is current Breast cancer screening: mammogram is current  Colorectal cancer screening: colorectal cancer screening is current  Osteoporosis screening: bone mineral density testing is not indicated  Screening lab work includes glucose and lipid profile  The immunizations are up to date and Td next year  Advice and education were given regarding weight loss  The patient was counseled regarding diagnostic results, impressions  The treatment plan was reviewed with the patient/guardian  The patient/guardian understands and agrees with the treatment plan      Chief Complaint  wellness      History of Present Illness  HM, Adult Female: The patient is being seen for a health maintenance evaluation  The last health maintenance visit was 1 year(s) ago  Social History: Household members include spouse and 3 son(s)  She is   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 [employer]  The patient has never smoked cigarettes  She reports rare alcohol use  The patient has no concerns about alcohol abuse  She has never used illicit drugs  General Health: The patient's health since the last visit is described as good  She has regular dental visits   Dental problems: no tooth pain and no caries  She complains of vision problems  Vision care includes wearing soft contact lenses and having regular eye examinations  She denies hearing loss  Hearing is normal   She doesn't wear a hearing aid  Immunizations status: up to date  Lifestyle:  She consumes a diverse and healthy diet  She does not have any weight concerns  She does not exercise regularly  She does not use tobacco  The patient has never smoked cigarettes  She consumes alcohol  She reports rare  She typically drinks wine, but no beer consumption and no hard liquor consumption  Alcohol concern: The patient has no concerns about alcohol abuse  She denies drug use  She has never used illicit drugs  Reproductive health: the patient is premenopausal   she reports normal menses  she uses contraception  For contraception, she uses oral contraception pills  she is sexually active  She is monogamous with a male partner  pregnancy history: G 3  Screening: Cervical cancer screening includes a pap smear performed last year  Breast cancer screening includes a mammogram performed this margo  Colorectal cancer screening includes a colonoscopy performed last year  Metabolic screening includes lipid profile performed within the past five years, glucose screening performed last year and thyroid function test performed 2014  Cardiovascular risk factors: obesity, sedentary lifestyle and family history of cardiovascular disease, but no hypertension, no diabetes, no high LDL cholesterol, no low HDL cholesterol, no stress, no tobacco use and no illicit drug use  Review of Systems    Constitutional: no fever, no recent weight gain and no chills  Cardiovascular: no chest pain and no palpitations  Respiratory: no shortness of breath and no cough  Gastrointestinal: no abdominal pain, no constipation and no diarrhea  Genitourinary: no dysuria and no incontinence  Musculoskeletal: right foot pain resolved with Naproxen  Integumentary: sunburn  Psychiatric: no anxiety, no sleep disturbances and no depression  Active Problems    1  Bee sting allergy (V15 06) (Z91 038)   2  History of Bereavement (V62 82) (Z63 4)   3  DM (diabetes mellitus screen) (V77 1) (Z13 1)   4  Encounter for gynecological examination without abnormal finding (V72 31) (Z01 419)   5  Encounter for routine gynecological examination (V72 31) (Z01 419)   6  Encounter for screening mammogram for malignant neoplasm of breast (V76 12)   (Z12 31)   7  Laboratory examination ordered as part of a routine general medical examination   (V72 62) (Z00 00)   8  History of Marital conflict (S10 55) (Q47 6)   9  Obesity (BMI 30-39 9) (278 00) (E66 9)   10  Oral contraceptive prescribed (V25 01) (Z30 011)   11  Screening for human papillomavirus (HPV) (V73 81) (Z11 51)   12  Screening for hyperlipidemia (V77 91) (Z13 220)    Past Medical History    · History of , threatened, early pregnancy (640 03) (O20 0)   · History of Acute tonsillitis (463) (J03 90)   · History of Age At First Period 15 Years Old (Menarche)   · History of Embryonic demise (69 849 69 22) (O02 1)   · History of acute pharyngitis (V12 69) (Z87 09)   · History of fever (V13 89) (O54 282)   · History of migraine (V12 49) (Z86 69)   · History of Major depressive disorder, single episode, moderate with peripartum onset  (296 22) (F32 1)   · Normal delivery (650) (O80,Z37  9)   · Oral contraceptive prescribed (V25 01) (Z30 011)   · History of Pain in right foot (729 5) (M79 671)   · History of Type B influenza (487 1) (J10 1)    Surgical History    · Denied: History Of Prior Surgery    Family History  Mother    · Family history of Hypertension (V17 49)  Father    · Family history of Colon Cancer (V16 0)   · Family history of Colon Cancer (V16 0)  Maternal Grandfather    · Family history of Diabetes Mellitus (V18 0)  Family History    · Family history of Colon Cancer (V16 0)   · Family history of Heart Disease (V17 49)    Social History    · Being A Social Drinker   · History of Bereavement (V62 82) (Z63 4)   · Daily Tea Consumption (3  Cups/Day)   · Denied: History of Drug Use   · History of Marital conflict (V22 24) (F26 5)   · Never A Smoker    Current Meds   1  EpiPen 2-Adama 0 3 MG/0 3ML Injection Solution Auto-injector; 0 3mg IM in thigh prn   anaphylaxis call 911; Therapy: 96ANS1712 to (Evaluate:30Apr2016)  Requested for: 30Apr2016; Last   Rx:30Apr2016 Ordered   2  Norethin Ace-Eth Estrad-FE 1-20 MG-MCG(24) Oral Tablet; take 1 tablet by mouth every   day; Therapy: 97ORG6758 to (Evaluate:06Oct2017)  Requested for: 93IPZ4459; Last   Rx:04Nov2016 Ordered    Allergies    1  No Known Drug Allergies    2  Bee sting    Vitals   Recorded: 38Cpk8009 09:04AM   Temperature 98 5 F   Heart Rate 83   Respiration 18   Systolic 463   Diastolic 74   Height 5 ft 2 in   Weight 197 lb 0 4 oz   BMI Calculated 36 04   BSA Calculated 1 9   O2 Saturation 97     Physical Exam    Constitutional   General appearance: No acute distress, well appearing and well nourished  obese  Head and Face   Head and face: Normal     Eyes   Conjunctiva and lids: No swelling, erythema or discharge  Ears, Nose, Mouth, and Throat   Otoscopic examination: Tympanic membranes translucent with normal light reflex  Canals patent without erythema  Oropharynx: Normal with no erythema, edema, exudate or lesions  Neck   Neck: Supple, symmetric, trachea midline, no masses  Thyroid: Normal, no thyromegaly  Pulmonary   Respiratory effort: No increased work of breathing or signs of respiratory distress  Auscultation of lungs: Clear to auscultation  Cardiovascular   Auscultation of heart: Normal rate and rhythm, normal S1 and S2, no murmurs  Examination of extremities for edema and/or varicosities: Normal     Abdomen   Abdomen: Non-tender, no masses  Liver and spleen: No hepatomegaly or splenomegaly      Lymphatic   Palpation of lymph nodes in neck: No lymphadenopathy  Musculoskeletal   Gait and station: Normal     Psychiatric   Orientation to person, place, and time: Normal     Mood and affect: Normal        Health Management  Screening for human papillomavirus (HPV)   (1) THIN PREP PAP WITH IMAGING; every 3 years; Last 41Kpm8672; Next Due:  47Xpd9084;  Overdue    Future Appointments    Date/Time Provider Specialty Site   11/10/2017 10:00 AM Venkata Underwood DO Obstetrics/Gynecology [facility] [place]     Signatures   Electronically signed by : LETTY Pineda ; Jul 17 2017  9:28AM EST                       (Author)

## 2018-01-18 NOTE — PROGRESS NOTES
Assessment    1  Encounter for preventive health examination (V70 0) (Z00 00)    Discussion/Summary  health maintenance visit Currently, she eats a poor diet and has an inadequate exercise regimen  cervical cancer screening is current cervical cancer screening is managed by Dr Ab Velasquez Breast cancer screening: mammogram is current  Colorectal cancer screening: colorectal cancer screening is current and colorectal cancer screening is managed by Dr Vadim Lugo  Osteoporosis screening: bone mineral density testing is not indicated  The immunizations are up to date  Advice and education were given regarding aerobic exercise and weight loss  Patient discussion: discussed with the patient, 30 minute visit, greater than half of the time was spent on counseling  Chief Complaint  Patient presents to office today for a wellness visit  History of Present Illness  HM, Adult Female: The patient is being seen for a health maintenance evaluation  The last health maintenance visit was year(s) ago  Social History: Household members include spouse and 3 children, 15, 6 and 8  She is   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  General Health: The patient's health since the last visit is described as good  She has regular dental visits  She denies vision problems  She denies hearing loss  Immunizations status: up to date  Lifestyle:  She does not have a healthy diet  She has weight concerns  She does not exercise regularly  She does not use tobacco  She consumes alcohol  She reports occasional alcohol use  Reproductive health: the patient is premenopausal   she reports normal menses  pregnancy history: G 4P 1, 3  Screening: Cervical cancer screening includes a pap smear performed last year  Breast cancer screening includes a mammogram performed January  Colorectal cancer screening includes a colonoscopy performed 2016     metabolic screening reviewed and current  Cardiovascular risk factors: no hypertension and no diabetes  Review of Systems    Constitutional: no fever, not feeling poorly, no recent weight gain, no chills, not feeling tired and no recent weight loss  Eyes: no eyesight problems  ENT: recent pharyngitis, but no sore throat  Cardiovascular: no chest pain and no palpitations  Respiratory: no shortness of breath and no cough  Gastrointestinal: no abdominal pain, no constipation and no diarrhea  Genitourinary: no dysuria and no incontinence  Musculoskeletal: no arthralgias and no myalgias  Psychiatric: depression- was not taking Lexapro regularly and stopped completely; doing well overall with her mood, occasionally cries when she thinks about the miscarriage but work and family life going well  Active Problems    1  Bee sting allergy (V15 06) (Z91 038)   2  History of Bereavement (V62 82) (Z63 4)   3  DM (diabetes mellitus screen) (V77 1) (Z13 1)   4  Encounter for gynecological examination without abnormal finding (V72 31) (Z01 419)   5  Encounter for routine gynecological examination (V72 31) (Z01 419)   6  Encounter for screening mammogram for malignant neoplasm of breast (V76 12)   (Z12 31)   7  Laboratory examination ordered as part of a routine general medical examination   (V72 62) (Z00 00)   8  Marital conflict (P45 53) (Q44 5)   9  Obesity (BMI 30-39 9) (278 00) (E66 9)   10  Oral contraceptive prescribed (V25 01) (Z30 011)   11  Screening for human papillomavirus (HPV) (V73 81) (Z11 51)   12   Screening for hyperlipidemia (V77 91) (Z13 220)    Past Medical History    · History of , threatened, early pregnancy (640 03) (O20 0)   · History of Acute tonsillitis (463) (J03 90)   · History of Age At First Period 15 Years Old (Menarche)   · History of Embryonic demise (69 849 69 22) (O02 1)   · History of acute pharyngitis (V12 69) (Z87 09)   · History of fever (V13 89) (I32 739)   · History of migraine (V12 49) (Z86 69)   · History of Major depressive disorder, single episode, moderate with peripartum onset  (296 22) (F32 1)   · Normal delivery (650) (O80,Z37  9)   · Oral contraceptive prescribed (V25 01) (Z30 011)   · History of Type B influenza (487 1) (J10 1)    Surgical History    · Denied: History Of Prior Surgery    Family History  Mother    · Family history of Hypertension (V17 49)  Father    · Family history of Colon Cancer (V16 0)   · Family history of Colon Cancer (V16 0)  Maternal Grandfather    · Family history of Diabetes Mellitus (V18 0)  Family History    · Family history of Colon Cancer (V16 0)   · Family history of Heart Disease (V17 49)    Social History    · Being A Social Drinker   · History of Bereavement (V62 82) (Z63 4)   · Daily Tea Consumption (3  Cups/Day)   · Denied: History of Drug Use   · Marital conflict (Q72 73) (T15 1)   · Never A Smoker    Current Meds   1  EpiPen 2-Adama 0 3 MG/0 3ML Injection Solution Auto-injector; 0 3mg IM in thigh prn   anaphylaxis call 911; Therapy: [id number] to (Evaluate:30Apr2016)  Requested for: 30Apr2016; Last   Rx:30Apr2016 Ordered    Allergies    1  No Known Drug Allergies    2  Bee sting    Vitals   Recorded: 10Jun2016 09:15AM   Heart Rate 72   Systolic 876, RUE, Sitting   Diastolic 76, RUE, Sitting   BP Cuff Size Large   Height 5 ft 2 in   Weight 195 lb 6 oz   BMI Calculated 35 73   BSA Calculated 1 89   O2 Saturation 99     Physical Exam    Constitutional   General appearance: No acute distress, well appearing and well nourished  obese  Head and Face   Head and face: Normal     Eyes   Conjunctiva and lids: No swelling, erythema or discharge  Ears, Nose, Mouth, and Throat   Otoscopic examination: Tympanic membranes translucent with normal light reflex  Canals patent without erythema  Oropharynx: Normal with no erythema, edema, exudate or lesions  Neck   Neck: Supple, symmetric, trachea midline, no masses  Thyroid: Normal, no thyromegaly  Pulmonary   Respiratory effort: No increased work of breathing or signs of respiratory distress  Auscultation of lungs: Clear to auscultation  Cardiovascular   Auscultation of heart: Normal rate and rhythm, normal S1 and S2, no murmurs  Abdomen   Abdomen: Non-tender, no masses  Liver and spleen: No hepatomegaly or splenomegaly  Lymphatic   Palpation of lymph nodes in neck: No lymphadenopathy  Musculoskeletal   Gait and station: Normal     Psychiatric   Orientation to person, place, and time: Normal     Mood and affect: Normal        Health Management  Screening for human papillomavirus (HPV)   (1) THIN PREP PAP WITH IMAGING; every 3 years; Last [id number]; Next Due:  [id number]; Overdue    Future Appointments    Date/Time Provider Specialty Site   11/04/2016 10:00 AM Nnamdi Meneses DO Obstetrics/Gynecology [facility]   06/12/2017 10:00 AM LETTY Bull   [facility]     Signatures   Electronically signed by : LETTY Delarosa ; Benny 10 2016  9:50AM EST                       (Author)

## 2018-01-22 VITALS
BODY MASS INDEX: 36.66 KG/M2 | DIASTOLIC BLOOD PRESSURE: 72 MMHG | WEIGHT: 199.2 LBS | HEIGHT: 62 IN | SYSTOLIC BLOOD PRESSURE: 128 MMHG

## 2018-01-22 VITALS
TEMPERATURE: 97.7 F | SYSTOLIC BLOOD PRESSURE: 110 MMHG | OXYGEN SATURATION: 94 % | HEART RATE: 106 BPM | BODY MASS INDEX: 35.88 KG/M2 | WEIGHT: 195 LBS | DIASTOLIC BLOOD PRESSURE: 70 MMHG | HEIGHT: 62 IN

## 2018-01-22 VITALS
OXYGEN SATURATION: 97 % | RESPIRATION RATE: 18 BRPM | BODY MASS INDEX: 36.26 KG/M2 | TEMPERATURE: 98.5 F | SYSTOLIC BLOOD PRESSURE: 110 MMHG | HEART RATE: 83 BPM | WEIGHT: 197.03 LBS | HEIGHT: 62 IN | DIASTOLIC BLOOD PRESSURE: 74 MMHG

## 2018-01-26 ENCOUNTER — OFFICE VISIT (OUTPATIENT)
Dept: BARIATRICS | Facility: CLINIC | Age: 43
End: 2018-01-26
Payer: COMMERCIAL

## 2018-01-26 VITALS
DIASTOLIC BLOOD PRESSURE: 66 MMHG | WEIGHT: 199.8 LBS | BODY MASS INDEX: 35.4 KG/M2 | HEART RATE: 60 BPM | HEIGHT: 63 IN | SYSTOLIC BLOOD PRESSURE: 104 MMHG | TEMPERATURE: 98.7 F

## 2018-01-26 DIAGNOSIS — R73.09 ELEVATED GLUCOSE: ICD-10-CM

## 2018-01-26 DIAGNOSIS — E66.01 SEVERE OBESITY (BMI 35.0-39.9): Primary | Chronic | ICD-10-CM

## 2018-01-26 DIAGNOSIS — E78.00 PURE HYPERCHOLESTEROLEMIA: Chronic | ICD-10-CM

## 2018-01-26 PROBLEM — I26.99 PULMONARY EMBOLISM (HCC): Status: ACTIVE | Noted: 2017-07-28

## 2018-01-26 PROBLEM — D68.51 FACTOR V LEIDEN MUTATION (HCC): Status: ACTIVE | Noted: 2017-08-11

## 2018-01-26 PROCEDURE — 99204 OFFICE O/P NEW MOD 45 MIN: CPT | Performed by: INTERNAL MEDICINE

## 2018-01-26 RX ORDER — EPINEPHRINE 0.15 MG/.3ML
0.3 INJECTION INTRAMUSCULAR ONCE
COMMUNITY
End: 2022-04-20

## 2018-01-26 RX ORDER — RIVAROXABAN 20 MG/1
20 TABLET, FILM COATED ORAL DAILY
Refills: 11 | COMMUNITY
Start: 2018-01-06 | End: 2018-03-01 | Stop reason: SDUPTHER

## 2018-01-26 RX ORDER — EPINEPHRINE 0.3 MG/.3ML
0.3 INJECTION SUBCUTANEOUS ONCE
Status: CANCELLED | OUTPATIENT
Start: 2018-01-26 | End: 2018-01-26

## 2018-01-26 NOTE — PROGRESS NOTES
Assessment/Plan:    Severe obesity (BMI 35 0-39 9) ([facility] )  -Discussed options of HealthyCORE-Intensive Lifestyle Intervention Program, Very Low Calorie Diet-VLCD, Conservative Program and Intragastric Balloon and the role of weight loss medications   -Initial weight loss goal of 5-10% weight loss for improved health  -Screening labs-check A1c and insulin  -Patient is interested in pursuing Conservative Program            Pure hypercholesterolemia  LDL 160s, lifestyle changes for now      Goals:  Food log (ie ) www myfitnesspal com,sparkpeople  com,loseit com,calorieking  com,etc  , No sugary beverages  At least 64oz of water daily  , Increase physical activity by 10 minutes daily, 5162-2837 calories per day and Weekly weights    Follow up in approximately 2 months with Non-Surgical Physician/Advanced Practitioner  Diagnoses and all orders for this visit:    Severe obesity (BMI 35 0-39 9) ([place] [address] )  -     Hemoglobin A1c; Future  -     Insulin, fasting; Future    Pure hypercholesterolemia  -     Insulin, fasting; Future    Elevated glucose  -     Hemoglobin A1c; Future  -     Insulin, fasting; Future    Other orders  -     rivaroxaban (XARELTO) 20 mg tablet; Take 1 tablet by mouth daily  -     EPINEPHrine (EPIPEN) injection 0 3 mg; Inject 0 3 mL into the shoulder, thigh, or buttocks once   -     EPINEPHrine (EPIPEN JR) 0 15 mg/0 3 mL SOAJ; Inject 0 3 mg into the shoulder, thigh, or buttocks once  -     XARELTO 20 MG tablet; Take 20 mg by mouth daily          Subjective:      Patient ID: Elizabeth Smith  is a 43 y o  female with hyperlipidemia, elevated glucose, factor V Leiden mutation, hx of PE and obesity here to pursue weight managment    Patient is pursuing Conservative Program      Obesity HPI  Severity: Moderate  Onset:  Most of her life    Modifiers: Diet and Exercise and Curves  Contributing factors: Poor Food Choices, Insufficient Physical Activity, Pregnancy and Insufficient time to make appropriate lifestyle changes  Associated symptoms: fatigue    Goals: 10 lbs    B-usually skips  L-sandwich or soup  D- lean cuisine w/ salad and jello  S- bowl of cereal or snacks on chips w/ iced tea  Drinks:2 cups regular snapple, 11/2 bottles of   Dines out-3 times per week      The following portions of the patient's history were reviewed and updated as appropriate: allergies, current medications, past family history, past medical history, past social history, past surgical history and problem list     Review of Systems   Constitutional: Positive for fatigue  HENT: Negative for sore throat  Respiratory: Negative for shortness of breath  Cardiovascular: Negative for chest pain, palpitations and leg swelling  Gastrointestinal: Negative for abdominal pain, constipation and diarrhea  Endocrine: Negative for cold intolerance and heat intolerance  Genitourinary: Negative for difficulty urinating  Musculoskeletal: Negative for arthralgias  Neurological: Negative for dizziness and headaches  Psychiatric/Behavioral:        Depressed mood         Objective:     Physical Exam   Constitutional: She is oriented to person, place, and time  She appears well-developed  HENT:   Head: Normocephalic  Eyes: Conjunctivae are normal    Cardiovascular: Normal rate and regular rhythm  Pulmonary/Chest: Effort normal and breath sounds normal    Abdominal: Soft  There is no tenderness  Musculoskeletal: She exhibits edema  Neurological: She is alert and oriented to person, place, and time  Psychiatric: She has a normal mood and affect  Vitals reviewed

## 2018-01-26 NOTE — PATIENT INSTRUCTIONS
Goals: Food log (ie ) www myfitnesspal com,sparkpeople  com,loseit com,calorieking  com,etc    No sugary beverages  At least 64oz of water daily    Increase physical activity by 10 minutes daily  5024-2089 calories per day  Weekly weights

## 2018-01-26 NOTE — ASSESSMENT & PLAN NOTE
-Discussed options of HealthyCORE-Intensive Lifestyle Intervention Program, Very Low Calorie Diet-VLCD, Conservative Program and Intragastric Balloon and the role of weight loss medications   -Initial weight loss goal of 5-10% weight loss for improved health  -Screening labs-check A1c and insulin  -Patient is interested in pursuing Conservative Program

## 2018-02-09 ENCOUNTER — TELEPHONE (OUTPATIENT)
Dept: INTERNAL MEDICINE CLINIC | Facility: CLINIC | Age: 43
End: 2018-02-09

## 2018-02-09 NOTE — TELEPHONE ENCOUNTER
Patient lost her job   She is going to call the pharmacist and see if any of those two medications are cheaper

## 2018-02-09 NOTE — TELEPHONE ENCOUNTER
No, it has to be daily  Is it more expensive now? She can call her insurance and ask which one is covered for her now- Eliquis, Pradaxa?

## 2018-02-15 ENCOUNTER — TRANSCRIBE ORDERS (OUTPATIENT)
Dept: LAB | Facility: CLINIC | Age: 43
End: 2018-02-15

## 2018-02-16 NOTE — TELEPHONE ENCOUNTER
Patient said she has the pills until the end of Feb  She is loosing her coverage on March 1st but she said she might be getting a new job soon

## 2018-02-17 DIAGNOSIS — Z12.31 ENCOUNTER FOR SCREENING MAMMOGRAM FOR MALIGNANT NEOPLASM OF BREAST: ICD-10-CM

## 2018-02-19 ENCOUNTER — HOSPITAL ENCOUNTER (OUTPATIENT)
Dept: RADIOLOGY | Age: 43
Discharge: HOME | End: 2018-02-19
Attending: OBSTETRICS & GYNECOLOGY
Payer: COMMERCIAL

## 2018-02-19 DIAGNOSIS — Z12.31 ENCOUNTER FOR SCREENING MAMMOGRAM FOR MALIGNANT NEOPLASM OF BREAST: ICD-10-CM

## 2018-02-19 PROCEDURE — 77067 SCR MAMMO BI INCL CAD: CPT

## 2018-02-24 DIAGNOSIS — T63.444S ANAPHYLACTIC REACTION TO BEE STING, UNDETERMINED INTENT, SEQUELA: Primary | ICD-10-CM

## 2018-02-24 RX ORDER — EPINEPHRINE 0.3 MG/.3ML
INJECTION INTRAMUSCULAR
Qty: 2 EACH | Refills: 0 | Status: SHIPPED | OUTPATIENT
Start: 2018-02-24 | End: 2018-02-26 | Stop reason: SDUPTHER

## 2018-02-26 ENCOUNTER — HOSPITAL ENCOUNTER (OUTPATIENT)
Dept: MAMMOGRAPHY | Facility: CLINIC | Age: 43
Discharge: HOME | End: 2018-02-26
Payer: COMMERCIAL

## 2018-02-26 ENCOUNTER — HOSPITAL ENCOUNTER (OUTPATIENT)
Dept: ULTRASOUND IMAGING | Facility: CLINIC | Age: 43
Discharge: HOME | End: 2018-02-26
Payer: COMMERCIAL

## 2018-02-26 DIAGNOSIS — R92.8 ABNORMAL MAMMOGRAM: ICD-10-CM

## 2018-02-26 DIAGNOSIS — T63.444S ANAPHYLACTIC REACTION TO BEE STING, UNDETERMINED INTENT, SEQUELA: ICD-10-CM

## 2018-02-26 PROCEDURE — 77065 DX MAMMO INCL CAD UNI: CPT

## 2018-02-26 PROCEDURE — G0279 TOMOSYNTHESIS, MAMMO: HCPCS

## 2018-02-26 RX ORDER — EPINEPHRINE 0.3 MG/.3ML
INJECTION SUBCUTANEOUS
Qty: 2 EACH | Refills: 0 | Status: SHIPPED | OUTPATIENT
Start: 2018-02-26 | End: 2019-08-20 | Stop reason: SDUPTHER

## 2018-03-01 ENCOUNTER — TELEPHONE (OUTPATIENT)
Dept: OTHER | Facility: HOSPITAL | Age: 43
End: 2018-03-01

## 2018-03-01 DIAGNOSIS — D68.51 FACTOR V LEIDEN MUTATION (HCC): Primary | ICD-10-CM

## 2018-03-01 RX ORDER — RIVAROXABAN 20 MG/1
20 TABLET, FILM COATED ORAL DAILY
Qty: 30 TABLET | Refills: 5 | Status: SHIPPED | OUTPATIENT
Start: 2018-03-01 | End: 2018-11-01 | Stop reason: SDUPTHER

## 2018-03-01 NOTE — TELEPHONE ENCOUNTER
Emily Sutton to have her use 15mg a day  Please dispense samples  Please also give her the savings card to check if it will be affordable  I can give a new Rx that she can  with the savings card

## 2018-03-01 NOTE — TELEPHONE ENCOUNTER
Pt called in stating she just switched jobs and won't have benefits until early May and it will cost her $600 for her [address] checked for savings card and we have none but we do have samples of 15mg she states she's on 20 so Miguel Posadas said to ask if it would be okay if she could have those samples  She was able to get a refill prior to loosing her benefits so she will be out for about 3 weeks - she mentioned taking a baby aspirin if that would be ok  She uses [facility] and we do have savings cards for them and good RX so I will check on prices for her on there as well

## 2018-03-02 NOTE — TELEPHONE ENCOUNTER
Adivsed pt's  - they will be in for the samples, script and savings cards this afternoon  After checking the prices online it looks like it may still cost her about $400 but I told him to double check with the pharmacy

## 2018-07-18 ENCOUNTER — APPOINTMENT (OUTPATIENT)
Dept: LAB | Facility: CLINIC | Age: 43
End: 2018-07-18
Payer: COMMERCIAL

## 2018-07-18 DIAGNOSIS — I26.99 OTHER PULMONARY EMBOLISM WITHOUT ACUTE COR PULMONALE (HCC): ICD-10-CM

## 2018-07-18 DIAGNOSIS — Z13.1 ENCOUNTER FOR SCREENING FOR DIABETES MELLITUS: ICD-10-CM

## 2018-07-18 DIAGNOSIS — D68.51 ACTIVATED PROTEIN C RESISTANCE (HCC): ICD-10-CM

## 2018-07-18 DIAGNOSIS — Z13.220 ENCOUNTER FOR SCREENING FOR LIPOID DISORDERS: ICD-10-CM

## 2018-07-18 DIAGNOSIS — E66.9 OBESITY: ICD-10-CM

## 2018-07-18 LAB
ALBUMIN SERPL BCP-MCNC: 3.6 G/DL (ref 3.5–5)
ALP SERPL-CCNC: 71 U/L (ref 46–116)
ALT SERPL W P-5'-P-CCNC: 17 U/L (ref 12–78)
ANION GAP SERPL CALCULATED.3IONS-SCNC: 5 MMOL/L (ref 4–13)
AST SERPL W P-5'-P-CCNC: 9 U/L (ref 5–45)
BASOPHILS # BLD AUTO: 0.04 THOUSANDS/ΜL (ref 0–0.1)
BASOPHILS NFR BLD AUTO: 1 % (ref 0–1)
BILIRUB SERPL-MCNC: 0.61 MG/DL (ref 0.2–1)
BUN SERPL-MCNC: 15 MG/DL (ref 5–25)
CALCIUM SERPL-MCNC: 8.9 MG/DL (ref 8.3–10.1)
CHLORIDE SERPL-SCNC: 108 MMOL/L (ref 100–108)
CHOLEST SERPL-MCNC: 171 MG/DL (ref 50–200)
CO2 SERPL-SCNC: 28 MMOL/L (ref 21–32)
CREAT SERPL-MCNC: 0.73 MG/DL (ref 0.6–1.3)
EOSINOPHIL # BLD AUTO: 0.4 THOUSAND/ΜL (ref 0–0.61)
EOSINOPHIL NFR BLD AUTO: 5 % (ref 0–6)
ERYTHROCYTE [DISTWIDTH] IN BLOOD BY AUTOMATED COUNT: 12.1 % (ref 11.6–15.1)
GFR SERPL CREATININE-BSD FRML MDRD: 102 ML/MIN/1.73SQ M
GLUCOSE P FAST SERPL-MCNC: 93 MG/DL (ref 65–99)
HCT VFR BLD AUTO: 37.9 % (ref 34.8–46.1)
HDLC SERPL-MCNC: 45 MG/DL (ref 40–60)
HGB BLD-MCNC: 12.2 G/DL (ref 11.5–15.4)
IMM GRANULOCYTES # BLD AUTO: 0.02 THOUSAND/UL (ref 0–0.2)
IMM GRANULOCYTES NFR BLD AUTO: 0 % (ref 0–2)
LDLC SERPL CALC-MCNC: 115 MG/DL (ref 0–100)
LYMPHOCYTES # BLD AUTO: 2.07 THOUSANDS/ΜL (ref 0.6–4.47)
LYMPHOCYTES NFR BLD AUTO: 27 % (ref 14–44)
MCH RBC QN AUTO: 30.5 PG (ref 26.8–34.3)
MCHC RBC AUTO-ENTMCNC: 32.2 G/DL (ref 31.4–37.4)
MCV RBC AUTO: 95 FL (ref 82–98)
MONOCYTES # BLD AUTO: 0.65 THOUSAND/ΜL (ref 0.17–1.22)
MONOCYTES NFR BLD AUTO: 9 % (ref 4–12)
NEUTROPHILS # BLD AUTO: 4.39 THOUSANDS/ΜL (ref 1.85–7.62)
NEUTS SEG NFR BLD AUTO: 58 % (ref 43–75)
NRBC BLD AUTO-RTO: 0 /100 WBCS
PLATELET # BLD AUTO: 317 THOUSANDS/UL (ref 149–390)
PMV BLD AUTO: 9.9 FL (ref 8.9–12.7)
POTASSIUM SERPL-SCNC: 4.3 MMOL/L (ref 3.5–5.3)
PROT SERPL-MCNC: 7.1 G/DL (ref 6.4–8.2)
RBC # BLD AUTO: 4 MILLION/UL (ref 3.81–5.12)
SODIUM SERPL-SCNC: 141 MMOL/L (ref 136–145)
TRIGL SERPL-MCNC: 55 MG/DL
WBC # BLD AUTO: 7.57 THOUSAND/UL (ref 4.31–10.16)

## 2018-07-18 PROCEDURE — 85025 COMPLETE CBC W/AUTO DIFF WBC: CPT

## 2018-07-18 PROCEDURE — 80061 LIPID PANEL: CPT

## 2018-07-18 PROCEDURE — 36415 COLL VENOUS BLD VENIPUNCTURE: CPT

## 2018-07-18 PROCEDURE — 80053 COMPREHEN METABOLIC PANEL: CPT

## 2018-07-19 DIAGNOSIS — Z13.220 ENCOUNTER FOR SCREENING FOR LIPOID DISORDERS: ICD-10-CM

## 2018-07-19 DIAGNOSIS — I26.99 OTHER PULMONARY EMBOLISM WITHOUT ACUTE COR PULMONALE (HCC): ICD-10-CM

## 2018-07-19 DIAGNOSIS — E66.9 OBESITY: ICD-10-CM

## 2018-07-19 DIAGNOSIS — Z13.1 ENCOUNTER FOR SCREENING FOR DIABETES MELLITUS: ICD-10-CM

## 2018-07-19 DIAGNOSIS — D68.51 ACTIVATED PROTEIN C RESISTANCE (HCC): ICD-10-CM

## 2018-08-03 ENCOUNTER — OFFICE VISIT (OUTPATIENT)
Dept: INTERNAL MEDICINE CLINIC | Facility: CLINIC | Age: 43
End: 2018-08-03
Payer: COMMERCIAL

## 2018-08-03 VITALS
DIASTOLIC BLOOD PRESSURE: 66 MMHG | OXYGEN SATURATION: 99 % | WEIGHT: 193.4 LBS | SYSTOLIC BLOOD PRESSURE: 110 MMHG | BODY MASS INDEX: 34.27 KG/M2 | HEIGHT: 63 IN | HEART RATE: 60 BPM

## 2018-08-03 DIAGNOSIS — Z23 NEED FOR IMMUNIZATION AGAINST TETANUS ALONE: ICD-10-CM

## 2018-08-03 DIAGNOSIS — E66.01 SEVERE OBESITY (BMI 35.0-39.9): Chronic | ICD-10-CM

## 2018-08-03 DIAGNOSIS — Z00.00 WELLNESS EXAMINATION: Primary | ICD-10-CM

## 2018-08-03 DIAGNOSIS — I26.99 OTHER PULMONARY EMBOLISM WITHOUT ACUTE COR PULMONALE, UNSPECIFIED CHRONICITY (HCC): ICD-10-CM

## 2018-08-03 DIAGNOSIS — Z79.01 CHRONIC ANTICOAGULATION: ICD-10-CM

## 2018-08-03 DIAGNOSIS — D68.51 FACTOR V LEIDEN MUTATION (HCC): ICD-10-CM

## 2018-08-03 DIAGNOSIS — R07.89 CHEST TIGHTNESS: ICD-10-CM

## 2018-08-03 PROCEDURE — 90714 TD VACC NO PRESV 7 YRS+ IM: CPT

## 2018-08-03 PROCEDURE — 90471 IMMUNIZATION ADMIN: CPT

## 2018-08-03 PROCEDURE — 99396 PREV VISIT EST AGE 40-64: CPT | Performed by: INTERNAL MEDICINE

## 2018-08-03 PROCEDURE — 93000 ELECTROCARDIOGRAM COMPLETE: CPT | Performed by: INTERNAL MEDICINE

## 2018-08-03 NOTE — PROGRESS NOTES
Assessment/Plan:  Cont Xarelto  EKG sinus bradycardia  Chest tightness/atypical CP-monitor at this time       Problem List Items Addressed This Visit     Severe obesity (BMI 35 0-39 9) ([facility]) (Chronic)    Relevant Orders    Lipid panel    CBC and differential    Comprehensive metabolic panel    Factor V Leiden mutation ([address] )    Relevant Orders    CBC and differential    Comprehensive metabolic panel    Pulmonary embolism (HCC)    Relevant Orders    CBC and differential    Comprehensive metabolic panel    Chronic anticoagulation    Relevant Orders    CBC and differential    Comprehensive metabolic panel      Other Visit Diagnoses     Wellness examination    -  Primary    Chest tightness        Relevant Orders    POCT ECG (Completed)    Need for immunization against tetanus alone        Relevant Orders    TD Vaccine greater than or equal to 6yo Preservative Free IM            Subjective:      Patient ID: Devang Cleveland is a 43 y o  female  HPI  Here for her wellness  Chest tightness once in a while with exertion that started a few weeks ago  Only lasts a few seconds, denies SOB, palpitations  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  Increase in work load but she is liking it there so far  Factor V , h/o PE and is on maintenance Xarelto 20mg a day  Up to date with gyn exam and mammogram  Up to date with metabolic panel  She saw Dr Rylan Turner earlier this year but lost insurance right after  Now, it is too far from where she works to go back there  Regular diet, no exercise    The following portions of the patient's history were reviewed and updated as appropriate: allergies, current medications, past family history, past medical history, past social history, past surgical history and problem list     Review of Systems   Constitutional: Negative for fatigue, fever and unexpected weight change     HENT: Negative for ear pain, hearing loss, sinus pain, sinus pressure and sore throat  Respiratory: Negative for cough, shortness of breath and wheezing  Cardiovascular: Positive for chest pain  Negative for palpitations and leg swelling  Gastrointestinal: Negative for abdominal pain, constipation, diarrhea, nausea and vomiting  Musculoskeletal: Negative for arthralgias and myalgias  Neurological: Negative for dizziness and headaches  Objective:      /66   Pulse 60   Ht 5' 2 5" (1 588 m)   Wt 87 7 kg (193 lb 6 4 oz)   SpO2 99%   BMI 34 81 kg/m²          Physical Exam   Constitutional: She is oriented to person, place, and time  She appears well-developed and well-nourished  HENT:   Head: Normocephalic and atraumatic  Right Ear: External ear normal    Left Ear: External ear normal    Mouth/Throat: Oropharynx is clear and moist    Eyes: Conjunctivae are normal    Neck: Neck supple  Cardiovascular: Normal rate, regular rhythm and normal heart sounds  No murmur heard  Pulmonary/Chest: Effort normal and breath sounds normal  No respiratory distress  She has no wheezes  She has no rales  Abdominal: Soft  Bowel sounds are normal  She exhibits no distension and no mass  There is no tenderness  There is no rebound and no guarding  Musculoskeletal: Normal range of motion  Neurological: She is alert and oriented to person, place, and time  Skin: Skin is warm and dry  Psychiatric: She has a normal mood and affect   Her behavior is normal  Judgment and thought content normal

## 2018-11-01 DIAGNOSIS — D68.51 FACTOR V LEIDEN MUTATION (HCC): ICD-10-CM

## 2018-11-01 RX ORDER — RIVAROXABAN 20 MG/1
TABLET, FILM COATED ORAL
Qty: 30 TABLET | Refills: 5 | Status: SHIPPED | OUTPATIENT
Start: 2018-11-01 | End: 2019-03-30 | Stop reason: SDUPTHER

## 2018-12-28 ENCOUNTER — TELEPHONE (OUTPATIENT)
Dept: OBGYN CLINIC | Facility: MEDICAL CENTER | Age: 43
End: 2018-12-28

## 2018-12-28 DIAGNOSIS — Z12.31 ENCOUNTER FOR SCREENING MAMMOGRAM FOR MALIGNANT NEOPLASM OF BREAST: Primary | ICD-10-CM

## 2018-12-28 NOTE — TELEPHONE ENCOUNTER
Patient needs a mammogram script in her chart for an appointment in February  She originally had an appointment for her yearly with Dr Abbey Rodríguez in February, but he will not be in the office that day and we needed to r/s her to May

## 2019-03-01 ENCOUNTER — HOSPITAL ENCOUNTER (OUTPATIENT)
Dept: RADIOLOGY | Age: 44
Discharge: HOME | End: 2019-03-01
Payer: COMMERCIAL

## 2019-03-01 VITALS — HEIGHT: 62 IN | WEIGHT: 195 LBS | BODY MASS INDEX: 35.88 KG/M2

## 2019-03-01 DIAGNOSIS — Z12.31 ENCOUNTER FOR SCREENING MAMMOGRAM FOR MALIGNANT NEOPLASM OF BREAST: ICD-10-CM

## 2019-03-01 PROCEDURE — 77063 BREAST TOMOSYNTHESIS BI: CPT

## 2019-03-01 PROCEDURE — 77067 SCR MAMMO BI INCL CAD: CPT

## 2019-03-30 DIAGNOSIS — D68.51 FACTOR V LEIDEN MUTATION (HCC): ICD-10-CM

## 2019-03-31 RX ORDER — RIVAROXABAN 20 MG/1
TABLET, FILM COATED ORAL
Qty: 30 TABLET | Refills: 4 | Status: SHIPPED | OUTPATIENT
Start: 2019-03-31 | End: 2019-08-26 | Stop reason: SDUPTHER

## 2019-05-24 ENCOUNTER — ANNUAL EXAM (OUTPATIENT)
Dept: OBGYN CLINIC | Facility: MEDICAL CENTER | Age: 44
End: 2019-05-24
Payer: COMMERCIAL

## 2019-05-24 VITALS
WEIGHT: 198.2 LBS | HEIGHT: 63 IN | BODY MASS INDEX: 35.12 KG/M2 | DIASTOLIC BLOOD PRESSURE: 78 MMHG | SYSTOLIC BLOOD PRESSURE: 110 MMHG

## 2019-05-24 DIAGNOSIS — Z12.4 CERVICAL CANCER SCREENING: ICD-10-CM

## 2019-05-24 DIAGNOSIS — Z12.39 SCREENING FOR MALIGNANT NEOPLASM OF BREAST: ICD-10-CM

## 2019-05-24 DIAGNOSIS — Z11.51 SCREENING FOR HUMAN PAPILLOMAVIRUS (HPV): ICD-10-CM

## 2019-05-24 DIAGNOSIS — Z01.419 ENCOUNTER FOR GYNECOLOGICAL EXAMINATION (GENERAL) (ROUTINE) WITHOUT ABNORMAL FINDINGS: Primary | ICD-10-CM

## 2019-05-24 PROCEDURE — 99396 PREV VISIT EST AGE 40-64: CPT | Performed by: OBSTETRICS & GYNECOLOGY

## 2019-05-29 LAB
CLINICAL INFO: NORMAL
CYTO CVX: NORMAL
DATE PREVIOUS BX: NORMAL
HPV I/H RISK 1 DNA CVX QL PROBE+SIG AMP: NOT DETECTED
LMP START DATE: NORMAL
SL AMB PREV. PAP:: NORMAL
SPECIMEN SOURCE CVX/VAG CYTO: NORMAL

## 2019-08-08 LAB
ALBUMIN SERPL-MCNC: 4 G/DL (ref 3.6–5.1)
ALBUMIN/GLOB SERPL: 1.6 (CALC) (ref 1–2.5)
ALP SERPL-CCNC: 68 U/L (ref 33–115)
ALT SERPL-CCNC: 9 U/L (ref 6–29)
AST SERPL-CCNC: 11 U/L (ref 10–30)
BASOPHILS # BLD AUTO: 37 CELLS/UL (ref 0–200)
BASOPHILS NFR BLD AUTO: 0.5 %
BILIRUB SERPL-MCNC: 0.5 MG/DL (ref 0.2–1.2)
BUN SERPL-MCNC: 12 MG/DL (ref 7–25)
BUN/CREAT SERPL: NORMAL (CALC) (ref 6–22)
CALCIUM SERPL-MCNC: 9.3 MG/DL (ref 8.6–10.2)
CHLORIDE SERPL-SCNC: 105 MMOL/L (ref 98–110)
CHOLEST SERPL-MCNC: 187 MG/DL
CHOLEST/HDLC SERPL: 4.3 (CALC)
CO2 SERPL-SCNC: 29 MMOL/L (ref 20–32)
CREAT SERPL-MCNC: 0.81 MG/DL (ref 0.5–1.1)
EOSINOPHIL # BLD AUTO: 241 CELLS/UL (ref 15–500)
EOSINOPHIL NFR BLD AUTO: 3.3 %
ERYTHROCYTE [DISTWIDTH] IN BLOOD BY AUTOMATED COUNT: 12 % (ref 11–15)
GLOBULIN SER CALC-MCNC: 2.5 G/DL (CALC) (ref 1.9–3.7)
GLUCOSE SERPL-MCNC: 96 MG/DL (ref 65–99)
HCT VFR BLD AUTO: 37.8 % (ref 35–45)
HDLC SERPL-MCNC: 43 MG/DL
HGB BLD-MCNC: 12.4 G/DL (ref 11.7–15.5)
LDLC SERPL CALC-MCNC: 128 MG/DL (CALC)
LYMPHOCYTES # BLD AUTO: 2402 CELLS/UL (ref 850–3900)
LYMPHOCYTES NFR BLD AUTO: 32.9 %
MCH RBC QN AUTO: 30.2 PG (ref 27–33)
MCHC RBC AUTO-ENTMCNC: 32.8 G/DL (ref 32–36)
MCV RBC AUTO: 92.2 FL (ref 80–100)
MONOCYTES # BLD AUTO: 679 CELLS/UL (ref 200–950)
MONOCYTES NFR BLD AUTO: 9.3 %
NEUTROPHILS # BLD AUTO: 3942 CELLS/UL (ref 1500–7800)
NEUTROPHILS NFR BLD AUTO: 54 %
NONHDLC SERPL-MCNC: 144 MG/DL (CALC)
PLATELET # BLD AUTO: 342 THOUSAND/UL (ref 140–400)
PMV BLD REES-ECKER: 9.9 FL (ref 7.5–12.5)
POTASSIUM SERPL-SCNC: 4.1 MMOL/L (ref 3.5–5.3)
PROT SERPL-MCNC: 6.5 G/DL (ref 6.1–8.1)
RBC # BLD AUTO: 4.1 MILLION/UL (ref 3.8–5.1)
SL AMB EGFR AFRICAN AMERICAN: 103 ML/MIN/1.73M2
SL AMB EGFR NON AFRICAN AMERICAN: 89 ML/MIN/1.73M2
SODIUM SERPL-SCNC: 140 MMOL/L (ref 135–146)
TRIGL SERPL-MCNC: 67 MG/DL
WBC # BLD AUTO: 7.3 THOUSAND/UL (ref 3.8–10.8)

## 2019-08-20 ENCOUNTER — OFFICE VISIT (OUTPATIENT)
Dept: INTERNAL MEDICINE CLINIC | Facility: CLINIC | Age: 44
End: 2019-08-20
Payer: COMMERCIAL

## 2019-08-20 VITALS
RESPIRATION RATE: 14 BRPM | TEMPERATURE: 97.6 F | WEIGHT: 197.6 LBS | SYSTOLIC BLOOD PRESSURE: 108 MMHG | OXYGEN SATURATION: 97 % | HEART RATE: 74 BPM | DIASTOLIC BLOOD PRESSURE: 72 MMHG | BODY MASS INDEX: 35.01 KG/M2 | HEIGHT: 63 IN

## 2019-08-20 DIAGNOSIS — I26.99 OTHER PULMONARY EMBOLISM WITHOUT ACUTE COR PULMONALE, UNSPECIFIED CHRONICITY (HCC): ICD-10-CM

## 2019-08-20 DIAGNOSIS — D68.51 FACTOR V LEIDEN MUTATION (HCC): ICD-10-CM

## 2019-08-20 DIAGNOSIS — Z79.01 CHRONIC ANTICOAGULATION: ICD-10-CM

## 2019-08-20 DIAGNOSIS — E66.01 SEVERE OBESITY WITH BODY MASS INDEX (BMI) OF 35.0 TO 39.9 WITH SERIOUS COMORBIDITY (HCC): Chronic | ICD-10-CM

## 2019-08-20 DIAGNOSIS — Z00.00 WELLNESS EXAMINATION: Primary | ICD-10-CM

## 2019-08-20 DIAGNOSIS — T63.444S ANAPHYLACTIC REACTION TO BEE STING, UNDETERMINED INTENT, SEQUELA: ICD-10-CM

## 2019-08-20 PROBLEM — Z12.4 CERVICAL CANCER SCREENING: Status: RESOLVED | Noted: 2019-05-24 | Resolved: 2019-08-20

## 2019-08-20 PROBLEM — Z01.419 ENCOUNTER FOR GYNECOLOGICAL EXAMINATION (GENERAL) (ROUTINE) WITHOUT ABNORMAL FINDINGS: Status: RESOLVED | Noted: 2019-05-24 | Resolved: 2019-08-20

## 2019-08-20 PROBLEM — Z11.51 SCREENING FOR HUMAN PAPILLOMAVIRUS (HPV): Status: RESOLVED | Noted: 2019-05-24 | Resolved: 2019-08-20

## 2019-08-20 PROBLEM — Z12.39 SCREENING FOR MALIGNANT NEOPLASM OF BREAST: Status: RESOLVED | Noted: 2019-05-24 | Resolved: 2019-08-20

## 2019-08-20 PROCEDURE — 99396 PREV VISIT EST AGE 40-64: CPT | Performed by: INTERNAL MEDICINE

## 2019-08-20 RX ORDER — EPINEPHRINE 0.3 MG/.3ML
INJECTION SUBCUTANEOUS
Qty: 2 EACH | Refills: 0 | Status: SHIPPED | OUTPATIENT
Start: 2019-08-20 | End: 2020-09-03 | Stop reason: SDUPTHER

## 2019-08-20 NOTE — PROGRESS NOTES
Assessment/Plan:    Factor V Leiden mutation ([facility] )  Risks and benefits discussed  She agrees with continuing Xarelto    Severe obesity (BMI 35 0-39  9)  BMI Counseling: Body mass index is 35 kg/m²  Discussed the patient's BMI with her  The BMI is above average  BMI counseling and education was provided to the patient  Nutrition recommendations include decreasing overall calorie intake  Low fat diet discussed             Problem List Items Addressed This Visit        Respiratory    Pulmonary embolism ([facility] )    Relevant Orders    CBC and differential    Comprehensive metabolic panel    Lipid panel       Hematopoietic and Hemostatic    Factor V Leiden mutation ([facility] )     Risks and benefits discussed  She agrees with continuing Xarelto         Relevant Orders    CBC and differential    Comprehensive metabolic panel    Lipid panel       Other    Chronic anticoagulation    Relevant Orders    CBC and differential    Comprehensive metabolic panel    Lipid panel    Severe obesity (BMI 35 0-39 9) (Chronic)     BMI Counseling: Body mass index is 35 kg/m²  Discussed the patient's BMI with her  The BMI is above average  BMI counseling and education was provided to the patient  Nutrition recommendations include decreasing overall calorie intake  Low fat diet discussed               Other Visit Diagnoses     Wellness examination    -  Primary    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, undetermined intent, sequela        Relevant Medications    EPINEPHrine (EPIPEN 2-MATT) 0 3 mg/0 3 mL SOAJ            Subjective:      Patient ID: Pattie Whittaker is a 37 y o  female      HPI  Here for a wellness  Metabolic screening reviewed- , normal CBC CMP  Mammogram performed in March  Pap in may was normal  Up to date with dental exam  Eye exam coming up-may need reading glasses  regular diet  No regular exercise  Non smoker  PE in 2017 + Factor V mutation, heterozygous  Nurse at [employer]    The following portions of the patient's history were reviewed and updated as appropriate: allergies, current medications, past family history, past medical history, past social history and problem list     Review of Systems   Constitutional: Negative for fatigue, fever and unexpected weight change  HENT: Negative for ear pain, hearing loss, nosebleeds, sinus pressure, sinus pain and sore throat  Respiratory: Negative for cough, shortness of breath and wheezing  Cardiovascular: Negative for chest pain, palpitations and leg swelling  Gastrointestinal: Negative for abdominal pain, blood in stool, constipation, diarrhea, nausea and vomiting  Genitourinary: Negative for menstrual problem (heavier bleeding but not prolonged)  Musculoskeletal: Negative for arthralgias and myalgias  Neurological: Negative for dizziness and headaches  Objective:      /72 (BP Location: Left arm, Patient Position: Sitting, Cuff Size: Large)   Pulse 74   Temp 97 6 °F (36 4 °C) (Oral)   Resp 14   Ht 5' 3" (1 6 m)   Wt 89 6 kg (197 lb 9 6 oz)   SpO2 97%   BMI 35 00 kg/m²          Physical Exam   Constitutional: She is oriented to person, place, and time  She appears well-developed and well-nourished  HENT:   Head: Normocephalic and atraumatic  Right Ear: External ear normal    Left Ear: External ear normal    Mouth/Throat: Oropharynx is clear and moist    Eyes: Conjunctivae are normal    Neck: Neck supple  Cardiovascular: Normal rate, regular rhythm and normal heart sounds  No murmur heard  Pulmonary/Chest: Effort normal and breath sounds normal  No respiratory distress  She has no wheezes  She has no rales  Abdominal: Soft  Bowel sounds are normal  She exhibits no distension and no mass  There is no tenderness  There is no rebound and no guarding  Musculoskeletal: Normal range of motion  Neurological: She is alert and oriented to person, place, and time  Skin: Skin is warm and dry  Psychiatric: She has a normal mood and affect  Her behavior is normal  Judgment and thought content normal

## 2019-08-20 NOTE — ASSESSMENT & PLAN NOTE
BMI Counseling: Body mass index is 35 kg/m²  Discussed the patient's BMI with her  The BMI is above average  BMI counseling and education was provided to the patient  Nutrition recommendations include decreasing overall calorie intake     Low fat diet discussed

## 2019-08-26 DIAGNOSIS — D68.51 FACTOR V LEIDEN MUTATION (HCC): ICD-10-CM

## 2019-08-26 RX ORDER — RIVAROXABAN 20 MG/1
TABLET, FILM COATED ORAL
Qty: 30 TABLET | Refills: 4 | Status: SHIPPED | OUTPATIENT
Start: 2019-08-26 | End: 2020-01-26

## 2020-01-08 ENCOUNTER — TELEPHONE (OUTPATIENT)
Dept: INTERNAL MEDICINE CLINIC | Facility: CLINIC | Age: 45
End: 2020-01-08

## 2020-01-08 DIAGNOSIS — Z20.828 EXPOSURE TO THE FLU: Primary | ICD-10-CM

## 2020-01-08 RX ORDER — OSELTAMIVIR PHOSPHATE 75 MG/1
75 CAPSULE ORAL DAILY
Qty: 14 CAPSULE | Refills: 0 | Status: SHIPPED | OUTPATIENT
Start: 2020-01-08 | End: 2020-01-22

## 2020-01-25 DIAGNOSIS — D68.51 FACTOR V LEIDEN MUTATION (HCC): ICD-10-CM

## 2020-01-26 RX ORDER — RIVAROXABAN 20 MG/1
TABLET, FILM COATED ORAL
Qty: 30 TABLET | Refills: 8 | Status: SHIPPED | OUTPATIENT
Start: 2020-01-26 | End: 2020-10-25

## 2020-02-11 NOTE — TELEPHONE ENCOUNTER
I called the patient and advised her    I also called the pharmacy and cancelled the Tamiflu
I sent tamiflu once daily for 2 weeks
Patient has the tamiflu from her employer  She is asking if it is safe to take since she takes xarelto      Please advise
Patient was advised by her job to take tamiflu due to a flu outbreak at her job  Patient was advised to call her PCP to see if it is okay to take since the patient is on xarelto      Please advise
Yes, it is safe  Please cancel Tamiflu I sent to her pharmacy
None

## 2020-04-06 ENCOUNTER — TELEPHONE (OUTPATIENT)
Dept: INTERNAL MEDICINE CLINIC | Facility: CLINIC | Age: 45
End: 2020-04-06

## 2020-04-06 DIAGNOSIS — J30.2 SEASONAL ALLERGIES: Primary | ICD-10-CM

## 2020-04-06 RX ORDER — FLUTICASONE PROPIONATE 50 MCG
1 SPRAY, SUSPENSION (ML) NASAL 2 TIMES DAILY PRN
Qty: 16 G | Refills: 1 | Status: SHIPPED | OUTPATIENT
Start: 2020-04-06

## 2020-04-24 ENCOUNTER — TELEMEDICINE (OUTPATIENT)
Dept: INTERNAL MEDICINE CLINIC | Facility: CLINIC | Age: 45
End: 2020-04-24
Payer: COMMERCIAL

## 2020-04-24 DIAGNOSIS — S01.20XA OPEN WOUND OF NOSE, UNSPECIFIED OPEN WOUND TYPE, INITIAL ENCOUNTER: Primary | ICD-10-CM

## 2020-04-24 PROCEDURE — 99212 OFFICE O/P EST SF 10 MIN: CPT | Performed by: INTERNAL MEDICINE

## 2020-05-07 ENCOUNTER — TELEPHONE (OUTPATIENT)
Dept: INTERNAL MEDICINE CLINIC | Facility: CLINIC | Age: 45
End: 2020-05-07

## 2020-06-15 ENCOUNTER — ANNUAL EXAM (OUTPATIENT)
Dept: OBGYN CLINIC | Facility: MEDICAL CENTER | Age: 45
End: 2020-06-15
Payer: COMMERCIAL

## 2020-06-15 VITALS
HEIGHT: 63 IN | DIASTOLIC BLOOD PRESSURE: 60 MMHG | BODY MASS INDEX: 34.91 KG/M2 | SYSTOLIC BLOOD PRESSURE: 118 MMHG | WEIGHT: 197 LBS

## 2020-06-15 DIAGNOSIS — D68.51 FACTOR V LEIDEN MUTATION (HCC): ICD-10-CM

## 2020-06-15 DIAGNOSIS — I26.99 OTHER PULMONARY EMBOLISM WITHOUT ACUTE COR PULMONALE, UNSPECIFIED CHRONICITY (HCC): ICD-10-CM

## 2020-06-15 DIAGNOSIS — E66.01 SEVERE OBESITY WITH BODY MASS INDEX (BMI) OF 35.0 TO 39.9 WITH SERIOUS COMORBIDITY (HCC): Chronic | ICD-10-CM

## 2020-06-15 DIAGNOSIS — Z12.39 SCREENING FOR MALIGNANT NEOPLASM OF BREAST: Primary | ICD-10-CM

## 2020-06-15 PROBLEM — Z01.419 ENCOUNTER FOR GYNECOLOGICAL EXAMINATION WITHOUT ABNORMAL FINDING: Status: ACTIVE | Noted: 2020-06-15

## 2020-06-15 PROCEDURE — 3008F BODY MASS INDEX DOCD: CPT | Performed by: STUDENT IN AN ORGANIZED HEALTH CARE EDUCATION/TRAINING PROGRAM

## 2020-06-15 PROCEDURE — 99396 PREV VISIT EST AGE 40-64: CPT | Performed by: STUDENT IN AN ORGANIZED HEALTH CARE EDUCATION/TRAINING PROGRAM

## 2020-09-03 ENCOUNTER — OFFICE VISIT (OUTPATIENT)
Dept: INTERNAL MEDICINE CLINIC | Facility: CLINIC | Age: 45
End: 2020-09-03
Payer: COMMERCIAL

## 2020-09-03 VITALS
DIASTOLIC BLOOD PRESSURE: 66 MMHG | WEIGHT: 193.6 LBS | TEMPERATURE: 98.7 F | OXYGEN SATURATION: 96 % | SYSTOLIC BLOOD PRESSURE: 114 MMHG | HEIGHT: 63 IN | BODY MASS INDEX: 34.3 KG/M2 | HEART RATE: 75 BPM

## 2020-09-03 DIAGNOSIS — Z00.00 LABORATORY EXAM ORDERED AS PART OF ROUTINE GENERAL MEDICAL EXAMINATION: ICD-10-CM

## 2020-09-03 DIAGNOSIS — T63.444S ANAPHYLACTIC REACTION TO BEE STING, UNDETERMINED INTENT, SEQUELA: ICD-10-CM

## 2020-09-03 DIAGNOSIS — E78.00 PURE HYPERCHOLESTEROLEMIA: Chronic | ICD-10-CM

## 2020-09-03 DIAGNOSIS — Z00.00 WELLNESS EXAMINATION: Primary | ICD-10-CM

## 2020-09-03 DIAGNOSIS — Z12.11 SCREEN FOR COLON CANCER: ICD-10-CM

## 2020-09-03 PROCEDURE — 1036F TOBACCO NON-USER: CPT | Performed by: INTERNAL MEDICINE

## 2020-09-03 PROCEDURE — 3725F SCREEN DEPRESSION PERFORMED: CPT | Performed by: INTERNAL MEDICINE

## 2020-09-03 PROCEDURE — 99396 PREV VISIT EST AGE 40-64: CPT | Performed by: INTERNAL MEDICINE

## 2020-09-03 RX ORDER — EPINEPHRINE 0.3 MG/.3ML
INJECTION SUBCUTANEOUS
Qty: 2 EACH | Refills: 0 | Status: SHIPPED | OUTPATIENT
Start: 2020-09-03 | End: 2021-06-15 | Stop reason: SDUPTHER

## 2020-09-03 NOTE — PROGRESS NOTES
Assessment/Plan:  BMI Counseling: Body mass index is 34 29 kg/m²  The BMI is above normal  Nutrition recommendations include moderation in carbohydrate intake and reducing intake of cholesterol  H/o PE Factor V heterozygous-opts to continue anticoagulation with Xarelto  Annual flu vaccine recommended     Problem List Items Addressed This Visit        Other    Pure hypercholesterolemia (Chronic)    Relevant Orders    CBC and Platelet    Comprehensive metabolic panel    Lipid Panel with Direct LDL reflex      Other Visit Diagnoses     Wellness examination    -  Primary    Screen for colon cancer        Relevant Orders    Ambulatory referral to Colorectal Surgery    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, undetermined intent, sequela        Relevant Medications    EPINEPHrine (EpiPen 2-Adama) 0 3 mg/0 3 mL SOAJ    Laboratory exam ordered as part of routine general medical examination        Relevant Orders    CBC and Platelet    Comprehensive metabolic panel    Lipid Panel with Direct LDL reflex            Subjective:      Patient ID: Paulette Cleveland is a 40 y o  female  HPI  Wellness  Up to date with metabolic screening and reviewed today  Lipids slightly elevated  Mammogram scheduled  Pap performed last year  Colonoscopy in Jan 2016 and 5 y repeat recommended  Non smoker  Social alcohol use  Regular diet, no regular exercise  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  , 3 sons-one in middle school and 2 in     The following portions of the patient's history were reviewed and updated as appropriate: allergies, current medications, past family history, past medical history, past social history, past surgical history and problem list     Review of Systems   Constitutional: Negative for chills and fever  HENT: Negative for congestion, rhinorrhea and sore throat  Respiratory: Negative for cough and shortness of breath  Cardiovascular: Negative for chest pain, palpitations and leg swelling     Gastrointestinal: Negative for abdominal pain, constipation, diarrhea, nausea and vomiting  Endocrine: Negative for polyuria  Genitourinary: Negative for difficulty urinating and menstrual problem  Nocturia lately   She is drinking more water because of wearing mask all day, dries her mouth   Neurological: Negative for dizziness and headaches  Objective:      /66   Pulse 75   Temp 98 7 °F (37 1 °C)   Ht 5' 3" (1 6 m)   Wt 87 8 kg (193 lb 9 6 oz)   SpO2 96%   BMI 34 29 kg/m²          Physical Exam  Constitutional:       Appearance: She is well-developed  HENT:      Head: Normocephalic and atraumatic  Right Ear: External ear normal  There is no impacted cerumen  Left Ear: External ear normal  There is no impacted cerumen  Eyes:      Conjunctiva/sclera: Conjunctivae normal    Neck:      Musculoskeletal: Neck supple  Cardiovascular:      Rate and Rhythm: Normal rate and regular rhythm  Heart sounds: Normal heart sounds  No murmur  Pulmonary:      Effort: Pulmonary effort is normal  No respiratory distress  Breath sounds: Normal breath sounds  No wheezing or rales  Abdominal:      General: There is no distension  Palpations: Abdomen is soft  There is no mass  Tenderness: There is no abdominal tenderness  There is no guarding or rebound  Musculoskeletal: Normal range of motion  Right lower leg: No edema  Left lower leg: No edema  Skin:     General: Skin is warm and dry  Neurological:      Mental Status: She is alert and oriented to person, place, and time  Psychiatric:         Behavior: Behavior normal          Thought Content:  Thought content normal          Judgment: Judgment normal

## 2020-10-19 ENCOUNTER — HOSPITAL ENCOUNTER (OUTPATIENT)
Dept: MAMMOGRAPHY | Facility: HOSPITAL | Age: 45
Discharge: HOME | End: 2020-10-19
Payer: COMMERCIAL

## 2020-10-19 VITALS — WEIGHT: 193 LBS | BODY MASS INDEX: 34.2 KG/M2 | HEIGHT: 63 IN

## 2020-10-19 DIAGNOSIS — Z12.39 SCREENING FOR MALIGNANT NEOPLASM OF BREAST: ICD-10-CM

## 2020-10-19 PROCEDURE — 77067 SCR MAMMO BI INCL CAD: CPT

## 2020-10-19 PROCEDURE — 77063 BREAST TOMOSYNTHESIS BI: CPT

## 2020-10-25 DIAGNOSIS — D68.51 FACTOR V LEIDEN MUTATION (HCC): ICD-10-CM

## 2020-10-25 RX ORDER — RIVAROXABAN 20 MG/1
TABLET, FILM COATED ORAL
Qty: 30 TABLET | Refills: 2 | Status: SHIPPED | OUTPATIENT
Start: 2020-10-25 | End: 2021-01-26

## 2020-11-12 ENCOUNTER — TELEPHONE (OUTPATIENT)
Dept: INTERNAL MEDICINE CLINIC | Facility: CLINIC | Age: 45
End: 2020-11-12

## 2021-01-23 DIAGNOSIS — D68.51 FACTOR V LEIDEN MUTATION (HCC): ICD-10-CM

## 2021-01-26 RX ORDER — RIVAROXABAN 20 MG/1
TABLET, FILM COATED ORAL
Qty: 30 TABLET | Refills: 8 | Status: SHIPPED | OUTPATIENT
Start: 2021-01-26 | End: 2021-08-26

## 2021-06-01 ENCOUNTER — TELEPHONE (OUTPATIENT)
Dept: GASTROENTEROLOGY | Facility: HOSPITAL | Age: 46
End: 2021-06-01

## 2021-06-02 ENCOUNTER — ANESTHESIA EVENT (OUTPATIENT)
Dept: GASTROENTEROLOGY | Facility: HOSPITAL | Age: 46
End: 2021-06-02

## 2021-06-02 ENCOUNTER — HOSPITAL ENCOUNTER (OUTPATIENT)
Dept: GASTROENTEROLOGY | Facility: HOSPITAL | Age: 46
Setting detail: OUTPATIENT SURGERY
Discharge: HOME | End: 2021-06-02
Attending: COLON & RECTAL SURGERY | Admitting: COLON & RECTAL SURGERY
Payer: COMMERCIAL

## 2021-06-02 ENCOUNTER — ANESTHESIA (OUTPATIENT)
Dept: GASTROENTEROLOGY | Facility: HOSPITAL | Age: 46
End: 2021-06-02

## 2021-06-02 VITALS
OXYGEN SATURATION: 100 % | WEIGHT: 198 LBS | DIASTOLIC BLOOD PRESSURE: 67 MMHG | HEIGHT: 63 IN | TEMPERATURE: 98.3 F | RESPIRATION RATE: 18 BRPM | HEART RATE: 83 BPM | BODY MASS INDEX: 35.08 KG/M2 | SYSTOLIC BLOOD PRESSURE: 108 MMHG

## 2021-06-02 DIAGNOSIS — Z80.0 FAMILY HISTORY OF COLON CANCER IN FATHER: ICD-10-CM

## 2021-06-02 LAB
EXT PREGNANCY TEST URINE: NEGATIVE
EXT. CONTROL: NORMAL

## 2021-06-02 PROCEDURE — 81025 URINE PREGNANCY TEST: CPT | Performed by: ANESTHESIOLOGY

## 2021-06-02 PROCEDURE — G0105 COLORECTAL SCRN; HI RISK IND: HCPCS | Performed by: COLON & RECTAL SURGERY

## 2021-06-02 PROCEDURE — 99213 OFFICE O/P EST LOW 20 MIN: CPT | Performed by: COLON & RECTAL SURGERY

## 2021-06-02 RX ORDER — LIDOCAINE HYDROCHLORIDE 10 MG/ML
INJECTION, SOLUTION EPIDURAL; INFILTRATION; INTRACAUDAL; PERINEURAL AS NEEDED
Status: DISCONTINUED | OUTPATIENT
Start: 2021-06-02 | End: 2021-06-02

## 2021-06-02 RX ORDER — PROPOFOL 10 MG/ML
INJECTION, EMULSION INTRAVENOUS AS NEEDED
Status: DISCONTINUED | OUTPATIENT
Start: 2021-06-02 | End: 2021-06-02

## 2021-06-02 RX ORDER — SODIUM CHLORIDE 9 MG/ML
INJECTION, SOLUTION INTRAVENOUS CONTINUOUS PRN
Status: DISCONTINUED | OUTPATIENT
Start: 2021-06-02 | End: 2021-06-02

## 2021-06-02 RX ADMIN — LIDOCAINE HYDROCHLORIDE 50 MG: 10 INJECTION, SOLUTION EPIDURAL; INFILTRATION; INTRACAUDAL; PERINEURAL at 08:38

## 2021-06-02 RX ADMIN — SODIUM CHLORIDE: 0.9 INJECTION, SOLUTION INTRAVENOUS at 08:33

## 2021-06-02 RX ADMIN — PROPOFOL 50 MG: 10 INJECTION, EMULSION INTRAVENOUS at 08:47

## 2021-06-02 RX ADMIN — PROPOFOL 100 MG: 10 INJECTION, EMULSION INTRAVENOUS at 08:38

## 2021-06-02 RX ADMIN — PROPOFOL 50 MG: 10 INJECTION, EMULSION INTRAVENOUS at 08:42

## 2021-06-02 NOTE — ANESTHESIA PREPROCEDURE EVALUATION
Procedure:  COLONOSCOPY    Relevant Problems   CARDIO   (+) Pulmonary embolism (HCC)   (+) Pure hypercholesterolemia      Other   (+) Chronic anticoagulation   (+) Factor V Leiden mutation (HCC)   (+) Severe obesity (BMI 35 0-39  9)      Stopped taking Xeralto on 5/31/21    Upreg:  Negative    Physical Exam    Airway    Mallampati score: II  TM Distance: >3 FB  Neck ROM: full     Dental   No notable dental hx     Cardiovascular  Cardiovascular exam normal    Pulmonary  Pulmonary exam normal     Other Findings        Anesthesia Plan  ASA Score- 2     Anesthesia Type- IV sedation with anesthesia with ASA Monitors  Additional Monitors:   Airway Plan:           Plan Factors-Exercise tolerance (METS): >4 METS  Chart reviewed  Existing labs reviewed  Patient summary reviewed  Patient is not a current smoker  Patient did not smoke on day of surgery  Induction- intravenous  Postoperative Plan-     Informed Consent- Anesthetic plan and risks discussed with patient  I personally reviewed this patient with the CRNA  Discussed and agreed on the Anesthesia Plan with the FARHEEN Lau

## 2021-06-02 NOTE — ANESTHESIA POSTPROCEDURE EVALUATION
Post-Op Assessment Note    CV Status:  Stable  Pain Score: 0    Pain management: adequate     Mental Status:  Arousable   Hydration Status:  Stable   PONV Controlled:  None   Airway Patency:  Patent      Post Op Vitals Reviewed: Yes      Staff: Anesthesiologist, CRNA         No complications documented      /71 (06/02/21 0853)    Temp 98 3 °F (36 8 °C) (06/02/21 0853)    Pulse 72 (06/02/21 0853)   Resp 18 (06/02/21 0853)    SpO2 100 % (06/02/21 0853)

## 2021-06-02 NOTE — H&P
History and Physical   Colon and Rectal Surgery   Paulette Heart 39 y o  female MRN: [id number]  Unit/Bed#:  Encounter: [id number]  21   8:32 AM      CC:  Increased risk screening  History of Present Illness   HPI:  Paulette Heart is a 39 y o  female whose father had colon cancer      Historical Information   Past Medical History:   Diagnosis Date     history     Last Assessed:3/9/2015    Acute pharyngitis     Acute tonsillitis     Depression     Embryonic demise     Last Assessed:2015    Fever     History of early menarche     Hx of migraines     Major depressive disorder, single episode, moderate with peripartum [facility] ([facility] )     Last Assessed:1/15/2016    Normal delivery     Oral contraceptive prescribed     Pain in right foot     PE [facility]pulmonary throm[facility]embolism) ([facility] )     Type B influenza      Past Surgical History:   Procedure Laterality Date    MOUTH SURGERY         Meds/Allergies     (Not in a hospital admission)        Current Outpatient Medications:     EPINEPHrine (EpiPen 2-Adama) 0 3 mg/0 3 mL SOAJ, Inject into thigh as needed, Disp: 2 each, Rfl: 0    EPINEPHrine (EPIPEN JR) 0 15 mg/0 3 mL SOAJ, Inject 0 3 mg into the shoulder, thigh, or buttocks once, Disp: , Rfl:     fluticasone (FLONASE) 50 mcg/act nasal spray, 1 spray into each nostril 2 (two) times a day as needed for rhinitis or allergies, Disp: 16 g, Rfl: 1    Xarelto 20 MG tablet, TAKE 1 TABLET BY MOUTH EVERY DAY, Disp: 30 tablet, Rfl: 8    Allergies   Allergen Reactions    Bee Venom Hives         Social History   Social History     Substance and Sexual Activity   Alcohol Use Yes    Frequency: Monthly or less    Drinks per session: 1 or 2    Comment: social      Social History     Substance and Sexual Activity   Drug Use No     Social History     Tobacco Use   Smoking Status Never Smoker   Smokeless Tobacco Never Used         Family History:   Family History   Problem Relation Age of Onset    Heart disease Mother     Hypertension Mother     Atrial fibrillation Mother     Colon cancer Father 77    Diabetes Maternal Grandfather     Heart disease Family     No Known Problems Son     No Known Problems Son     No Known Problems Son     No Known Problems Paternal Uncle      Review of Systems - General ROS: negative  Respiratory ROS: negative  Cardiovascular ROS: negative     Objective     Current Vitals:   Blood Pressure: 118/71 (06/02/21 0750)  Pulse: 73 (06/02/21 0750)  Temperature: 98 5 °F (36 9 °C) (06/02/21 0750)  Temp Source: Tympanic (06/02/21 0750)  Respirations: 18 (06/02/21 0750)  Height: 5' 3" (160 cm) (06/02/21 0750)  Weight - Scale: 89 8 kg (198 lb) (06/02/21 0750)  SpO2: 98 % (06/02/21 0750)  No intake or output data in the 24 hours ending 06/02/21 0832    Physical Exam:  General:  Well nourished, no distress  Neuro: Alert and oriented  Eyes:Sclera anicteric, conjunctiva pink  Pulm: Clear to auscultation bilaterally  No respiratory Distress  CV:  Regular rate and rhythm  No murmurs  Abdomen:  Soft, flat, non-tender, without masses or hepatosplenomegaly  Lab Results:       ASSESSMENT:  Taylor Lauren is a 39 y o  female for screening  PLAN:  Colonoscopy  Risks , including, but not limited to, bleeding, perforation, missed lesions, and potential need for surgery, were reviewed  Alternatives to colonoscopy were discussed    Laird Baumgarten, MD

## 2021-06-15 DIAGNOSIS — T63.444S ANAPHYLACTIC REACTION TO BEE STING, UNDETERMINED INTENT, SEQUELA: ICD-10-CM

## 2021-06-15 RX ORDER — EPINEPHRINE 0.3 MG/.3ML
INJECTION SUBCUTANEOUS
Qty: 2 EACH | Refills: 0 | Status: SHIPPED | OUTPATIENT
Start: 2021-06-15 | End: 2022-04-19 | Stop reason: SDUPTHER

## 2021-08-20 ENCOUNTER — ANNUAL EXAM (OUTPATIENT)
Dept: OBGYN CLINIC | Facility: MEDICAL CENTER | Age: 46
End: 2021-08-20
Payer: COMMERCIAL

## 2021-08-20 VITALS — WEIGHT: 204.8 LBS | HEIGHT: 62 IN | BODY MASS INDEX: 37.69 KG/M2

## 2021-08-20 DIAGNOSIS — Z12.31 ENCOUNTER FOR SCREENING MAMMOGRAM FOR MALIGNANT NEOPLASM OF BREAST: ICD-10-CM

## 2021-08-20 DIAGNOSIS — D68.51 FACTOR V LEIDEN MUTATION (HCC): ICD-10-CM

## 2021-08-20 DIAGNOSIS — Z01.419 ENCOUNTER FOR GYNECOLOGICAL EXAMINATION WITHOUT ABNORMAL FINDING: Primary | ICD-10-CM

## 2021-08-20 PROCEDURE — S0612 ANNUAL GYNECOLOGICAL EXAMINA: HCPCS | Performed by: STUDENT IN AN ORGANIZED HEALTH CARE EDUCATION/TRAINING PROGRAM

## 2021-08-20 NOTE — PROGRESS NOTES
Assessment/Plan:      40 yo  - annual exam   F/u in 1 yr or prn     Encounter for gynecological examination without abnormal finding  Pap due   Colonoscopy up to date - every 5 yrs due to family hx  Encounter for screening mammogram for malignant neoplasm of breast  -     Mammo screening bilateral w 3d & cad; Future    Factor V Leiden mutation ([facility] )          Subjective:     Patient ID: Ty Lance is a 39 y o  female  This is a 39 y o  [id number] with last menstrual period of 2021 (exact date) currently using 's vasectomy for contraception  She has regular periods of normal amount and duration  She has a h/o PE on xarelto  Periods are a bit heavier since stopping OCP and starting xarelto, but still tolerable  She is sexually active and declines STD testing today  She has no issues with intercourse  Screening:  Last pap smear:  neg/neg  Last mammo: Oct 19 2020 birads 1   Colonoscopy: 2021 - has every 5 years  Family history:   Father - colon cancer  Denies h/o breast or ovarian cancer  Has 3 sons  Has trip planned to [place] next month for anniversary  Review of Systems   Constitutional: Negative  HENT: Negative  Eyes: Negative  Respiratory: Negative  Cardiovascular: Negative  Gastrointestinal: Negative  Endocrine: Negative  Genitourinary: Negative for dyspareunia, dysuria, frequency, menstrual problem, pelvic pain, vaginal discharge and vaginal pain  Musculoskeletal: Negative  Skin: Negative  Allergic/Immunologic: Negative  Neurological: Negative  Hematological: Negative  Psychiatric/Behavioral: Negative  Objective:     Physical Exam  Vitals reviewed  Exam conducted with a chaperone present  Cardiovascular:      Rate and Rhythm: Normal rate     Pulmonary:      Effort: Pulmonary effort is normal    Chest:      Breasts: Breasts are symmetrical          Right: No mass, nipple discharge, skin change or tenderness  Left: No mass, nipple discharge, skin change or tenderness  Abdominal:      Palpations: Abdomen is soft  Genitourinary:     Labia:         Right: No rash  Left: No rash  Vagina: Normal  No signs of injury  Cervix: No cervical motion tenderness, discharge, friability or lesion  Uterus: Not enlarged and not fixed  Adnexa:         Right: No mass, tenderness or fullness  Left: No mass, tenderness or fullness  Musculoskeletal:      Cervical back: Normal range of motion  Skin:     General: Skin is warm and dry  Neurological:      Mental Status: She is alert and oriented to person, place, and time

## 2021-08-24 ENCOUNTER — APPOINTMENT (OUTPATIENT)
Dept: LAB | Facility: CLINIC | Age: 46
End: 2021-08-24
Payer: COMMERCIAL

## 2021-08-24 DIAGNOSIS — E78.00 PURE HYPERCHOLESTEROLEMIA: Chronic | ICD-10-CM

## 2021-08-24 DIAGNOSIS — D68.51 FACTOR V LEIDEN MUTATION (HCC): ICD-10-CM

## 2021-08-24 DIAGNOSIS — I26.99 OTHER PULMONARY EMBOLISM WITHOUT ACUTE COR PULMONALE, UNSPECIFIED CHRONICITY (HCC): ICD-10-CM

## 2021-08-24 DIAGNOSIS — Z79.01 CHRONIC ANTICOAGULATION: ICD-10-CM

## 2021-08-24 DIAGNOSIS — Z00.00 LABORATORY EXAM ORDERED AS PART OF ROUTINE GENERAL MEDICAL EXAMINATION: ICD-10-CM

## 2021-08-24 LAB
ALBUMIN SERPL BCP-MCNC: 3.8 G/DL (ref 3.5–5)
ALP SERPL-CCNC: 75 U/L (ref 46–116)
ALT SERPL W P-5'-P-CCNC: 20 U/L (ref 12–78)
ANION GAP SERPL CALCULATED.3IONS-SCNC: 7 MMOL/L (ref 4–13)
AST SERPL W P-5'-P-CCNC: 17 U/L (ref 5–45)
BASOPHILS # BLD AUTO: 0.03 THOUSANDS/ΜL (ref 0–0.1)
BASOPHILS NFR BLD AUTO: 0 % (ref 0–1)
BILIRUB SERPL-MCNC: 0.36 MG/DL (ref 0.2–1)
BUN SERPL-MCNC: 11 MG/DL (ref 5–25)
CALCIUM SERPL-MCNC: 9 MG/DL (ref 8.3–10.1)
CHLORIDE SERPL-SCNC: 105 MMOL/L (ref 100–108)
CHOLEST SERPL-MCNC: 217 MG/DL (ref 50–200)
CO2 SERPL-SCNC: 28 MMOL/L (ref 21–32)
CREAT SERPL-MCNC: 0.82 MG/DL (ref 0.6–1.3)
EOSINOPHIL # BLD AUTO: 0.15 THOUSAND/ΜL (ref 0–0.61)
EOSINOPHIL NFR BLD AUTO: 2 % (ref 0–6)
ERYTHROCYTE [DISTWIDTH] IN BLOOD BY AUTOMATED COUNT: 12.2 % (ref 11.6–15.1)
GFR SERPL CREATININE-BSD FRML MDRD: 87 ML/MIN/1.73SQ M
GLUCOSE P FAST SERPL-MCNC: 94 MG/DL (ref 65–99)
HCT VFR BLD AUTO: 42.6 % (ref 34.8–46.1)
HDLC SERPL-MCNC: 53 MG/DL
HGB BLD-MCNC: 13.7 G/DL (ref 11.5–15.4)
IMM GRANULOCYTES # BLD AUTO: 0.04 THOUSAND/UL (ref 0–0.2)
IMM GRANULOCYTES NFR BLD AUTO: 1 % (ref 0–2)
LDLC SERPL CALC-MCNC: 150 MG/DL (ref 0–100)
LYMPHOCYTES # BLD AUTO: 2.35 THOUSANDS/ΜL (ref 0.6–4.47)
LYMPHOCYTES NFR BLD AUTO: 28 % (ref 14–44)
MCH RBC QN AUTO: 30.8 PG (ref 26.8–34.3)
MCHC RBC AUTO-ENTMCNC: 32.2 G/DL (ref 31.4–37.4)
MCV RBC AUTO: 96 FL (ref 82–98)
MONOCYTES # BLD AUTO: 0.69 THOUSAND/ΜL (ref 0.17–1.22)
MONOCYTES NFR BLD AUTO: 8 % (ref 4–12)
NEUTROPHILS # BLD AUTO: 5.01 THOUSANDS/ΜL (ref 1.85–7.62)
NEUTS SEG NFR BLD AUTO: 61 % (ref 43–75)
NRBC BLD AUTO-RTO: 0 /100 WBCS
PLATELET # BLD AUTO: 331 THOUSANDS/UL (ref 149–390)
PMV BLD AUTO: 9.8 FL (ref 8.9–12.7)
POTASSIUM SERPL-SCNC: 4.4 MMOL/L (ref 3.5–5.3)
PROT SERPL-MCNC: 7.7 G/DL (ref 6.4–8.2)
RBC # BLD AUTO: 4.45 MILLION/UL (ref 3.81–5.12)
SODIUM SERPL-SCNC: 140 MMOL/L (ref 136–145)
TRIGL SERPL-MCNC: 68 MG/DL
WBC # BLD AUTO: 8.27 THOUSAND/UL (ref 4.31–10.16)

## 2021-08-24 PROCEDURE — 80053 COMPREHEN METABOLIC PANEL: CPT

## 2021-08-24 PROCEDURE — 85025 COMPLETE CBC W/AUTO DIFF WBC: CPT

## 2021-08-24 PROCEDURE — 36415 COLL VENOUS BLD VENIPUNCTURE: CPT

## 2021-08-24 PROCEDURE — 80061 LIPID PANEL: CPT

## 2021-08-26 DIAGNOSIS — D68.51 FACTOR V LEIDEN MUTATION (HCC): ICD-10-CM

## 2021-08-26 RX ORDER — RIVAROXABAN 20 MG/1
TABLET, FILM COATED ORAL
Qty: 90 TABLET | Refills: 2 | Status: SHIPPED | OUTPATIENT
Start: 2021-08-26 | End: 2022-05-31

## 2021-09-07 ENCOUNTER — OFFICE VISIT (OUTPATIENT)
Dept: INTERNAL MEDICINE CLINIC | Facility: CLINIC | Age: 46
End: 2021-09-07
Payer: COMMERCIAL

## 2021-09-07 VITALS
HEIGHT: 62 IN | SYSTOLIC BLOOD PRESSURE: 118 MMHG | HEART RATE: 70 BPM | DIASTOLIC BLOOD PRESSURE: 70 MMHG | TEMPERATURE: 97.5 F | WEIGHT: 204.6 LBS | BODY MASS INDEX: 37.65 KG/M2 | OXYGEN SATURATION: 97 %

## 2021-09-07 DIAGNOSIS — Z00.00 ANNUAL PHYSICAL EXAM: Primary | ICD-10-CM

## 2021-09-07 DIAGNOSIS — D68.51 FACTOR V LEIDEN MUTATION (HCC): ICD-10-CM

## 2021-09-07 DIAGNOSIS — Z11.59 NEED FOR HEPATITIS C SCREENING TEST: ICD-10-CM

## 2021-09-07 DIAGNOSIS — L40.9 PSORIASIS: ICD-10-CM

## 2021-09-07 DIAGNOSIS — Z79.01 CHRONIC ANTICOAGULATION: ICD-10-CM

## 2021-09-07 DIAGNOSIS — Z11.4 SCREENING FOR HIV (HUMAN IMMUNODEFICIENCY VIRUS): ICD-10-CM

## 2021-09-07 DIAGNOSIS — E66.01 SEVERE OBESITY WITH BODY MASS INDEX (BMI) OF 35.0 TO 39.9 WITH SERIOUS COMORBIDITY (HCC): Chronic | ICD-10-CM

## 2021-09-07 DIAGNOSIS — E78.00 PURE HYPERCHOLESTEROLEMIA: ICD-10-CM

## 2021-09-07 PROCEDURE — 3725F SCREEN DEPRESSION PERFORMED: CPT | Performed by: INTERNAL MEDICINE

## 2021-09-07 PROCEDURE — 99396 PREV VISIT EST AGE 40-64: CPT | Performed by: INTERNAL MEDICINE

## 2021-09-07 PROCEDURE — 3008F BODY MASS INDEX DOCD: CPT | Performed by: INTERNAL MEDICINE

## 2021-09-07 PROCEDURE — 1036F TOBACCO NON-USER: CPT | Performed by: INTERNAL MEDICINE

## 2021-09-07 RX ORDER — CLOBETASOL PROPIONATE 0.5 MG/G
CREAM TOPICAL 2 TIMES DAILY
Qty: 45 G | Refills: 0 | Status: SHIPPED | OUTPATIENT
Start: 2021-09-07

## 2021-09-07 NOTE — PROGRESS NOTES
[facility] [place]    NAME: Lillie Flowers  AGE: 39 y o  SEX: female  : 1975     DATE: 2021     Assessment and Plan:     Problem List Items Addressed This Visit        Hematopoietic and Hemostatic    Factor V Leiden mutation ([address] )     She agrees to continue the Xarelto given this mutation and h/o PE            Other    Chronic anticoagulation    Severe obesity (BMI 35 0-39 9) (Chronic)     Start regular exercise         Pure hypercholesterolemia (Chronic)     Continue to monitor         Relevant Orders    CBC and differential    Comprehensive metabolic panel    Lipid Panel with Direct LDL reflex      Other Visit Diagnoses     Annual physical exam    -  Primary    Need for hepatitis C screening test        Relevant Orders    Hepatitis C Antibody ([facility])    Screening for HIV (human immunodeficiency virus)        Relevant Orders    HIV 1/2 Antigen/Antibody (4th Generation) w Reflex [facility]    Psoriasis        Relevant Medications    clobetasol (TEMOVATE) 0 05 % cream          Immunizations and preventive care screenings were discussed with patient today  Appropriate education was printed on patient's after visit summary  Counseling:  · Exercise: the importance of regular exercise/physical activity was discussed  Recommend exercise 3-5 times per week for at least 30 minutes  BMI Counseling: Body mass index is 37 42 kg/m²  The BMI is above normal  Exercise recommendations include exercising 3-5 times per week  Return in about 1 year (around 2022)  Chief Complaint:     No chief complaint on file  History of Present Illness:     Adult Annual Physical   Patient here for a comprehensive physical exam  The patient reports problems - rash elbows  Diet and Physical Activity  · Diet/Nutrition: consuming 3-5 servings of fruits/vegetables daily  · Exercise: no formal exercise        Depression Screening  PHQ-9 Depression Screening    PHQ-9:   Frequency of the following problems over the past two weeks:      Little interest or pleasure in doing things: 0 - not at all  Feeling down, depressed, or hopeless: 0 - not at all  PHQ-2 Score: 0       General Health  · Sleep: sleeps well  · Hearing: normal - bilateral   · Vision: most recent eye exam >1 year ago, wears glasses and wears contacts  · Dental: regular dental visits  /GYN Health  · Patient is: premenopausal  · Contraceptive method: none  Review of Systems:     Review of Systems   Constitutional: Negative for chills, fatigue, fever and unexpected weight change  HENT: Negative for congestion and rhinorrhea  Respiratory: Negative for cough and shortness of breath  Cardiovascular: Negative for chest pain, palpitations and leg swelling  Gastrointestinal: Negative for abdominal pain, blood in stool, constipation, diarrhea, nausea and vomiting  Genitourinary: Negative for difficulty urinating and menstrual problem  Musculoskeletal: Negative for arthralgias and myalgias  Skin: Positive for rash (elbows)  Neurological: Negative for dizziness and headaches  Psychiatric/Behavioral: Negative for sleep disturbance        Past Medical History:     Past Medical History:   Diagnosis Date     history     Last Assessed:3/9/2015    Acute pharyngitis     Acute tonsillitis     Depression     Embryonic demise     Last Assessed:2015    Fever     History of early menarche     Hx of migraines     Major depressive disorder, single episode, moderate with peripartum onset ([facility] [facility] )     Last Assessed:1/15/2016    Normal delivery     Oral contraceptive prescribed     Pain in right foot     PE (pulmonary thromboembolism) [facility] )     Type B influenza       Past Surgical History:     Past Surgical History:   Procedure Laterality Date    MOUTH SURGERY        Social History:     Social History     Socioeconomic History    Marital status: /Civil Union     Spouse name: None    Number of children: None    Years of education: None    Highest education level: None   Occupational History    None   Tobacco Use    Smoking status: Never Smoker    Smokeless tobacco: Never Used   Vaping Use    Vaping Use: Never used   Substance and Sexual Activity    Alcohol use: Yes     Comment: social     Drug use: No    Sexual activity: Yes     Partners: Male     Birth control/protection: Male Sterilization, None   Other Topics Concern    None   Social History Narrative    Bereavement    Daily Tea consumption:3 cups a day    Marital conflict     Social Determinants of Health     Financial Resource Strain:     Difficulty of Paying Living Expenses:    Food Insecurity:     Worried About Running Out of Food in the Last Year:     Ran Out of Food in the Last Year:    Transportation Needs:     Lack of Transportation (Medical):      Lack of Transportation (Non-Medical):    Physical Activity:     Days of Exercise per Week:     Minutes of Exercise per Session:    Stress:     Feeling of Stress :    Social Connections:     Frequency of Communication with Friends and Family:     Frequency of Social Gatherings with Friends and Family:     Attends Orthodoxy Services:     Active Member of Clubs or Organizations:     Attends Club or Organization Meetings:     Marital Status:    Intimate Partner Violence:     Fear of Current or Ex-Partner:     Emotionally Abused:     Physically Abused:     Sexually Abused:       Family History:     Family History   Problem Relation Age of Onset    Heart disease Mother     Hypertension Mother     Atrial fibrillation Mother     Colon cancer Father 77    Diabetes Maternal Grandfather     No Known Problems Son     No Known Problems Son     No Known Problems Son     No Known Problems Paternal Uncle     Heart disease Family     Breast cancer Neg Hx     Ovarian cancer Neg Hx       Current Medications: Current Outpatient Medications   Medication Sig Dispense Refill    EPINEPHrine (EpiPen 2-Adama) 0 3 mg/0 3 mL SOAJ Inject into thigh as needed 2 each 0    fluticasone (FLONASE) 50 mcg/act nasal spray 1 spray into each nostril 2 (two) times a day as needed for rhinitis or allergies 16 g 1    Xarelto 20 MG tablet TAKE 1 TABLET BY MOUTH EVERY DAY 90 tablet 2    clobetasol (TEMOVATE) 0 05 % cream Apply topically 2 (two) times a day 45 g 0    EPINEPHrine (EPIPEN JR) 0 15 mg/0 3 mL SOAJ Inject 0 3 mg into the shoulder, thigh, or buttocks once (Patient not taking: Reported on 8/20/2021)       No current facility-administered medications for this visit  Allergies: Allergies   Allergen Reactions    Bee Venom Hives      Physical Exam:     /70   Pulse 70   Temp 97 5 °F (36 4 °C) (Temporal)   Ht 5' 2" (1 575 m)   Wt 92 8 kg (204 lb 9 6 oz)   SpO2 97%   BMI 37 42 kg/m²     Physical Exam  Constitutional:       General: She is not in acute distress  Appearance: She is well-developed  She is obese  She is not ill-appearing, toxic-appearing or diaphoretic  HENT:      Head: Normocephalic and atraumatic  Right Ear: External ear normal  There is no impacted cerumen  Left Ear: External ear normal  There is no impacted cerumen  Eyes:      Conjunctiva/sclera: Conjunctivae normal    Cardiovascular:      Rate and Rhythm: Normal rate and regular rhythm  Heart sounds: Normal heart sounds  No murmur heard  Pulmonary:      Effort: Pulmonary effort is normal  No respiratory distress  Breath sounds: Normal breath sounds  No stridor  No wheezing or rales  Abdominal:      General: There is no distension  Palpations: Abdomen is soft  There is no mass  Tenderness: There is no abdominal tenderness  There is no guarding or rebound  Musculoskeletal:         General: Normal range of motion  Cervical back: Neck supple     Skin:     Findings: Rash (dry patch on each elbow) present  Neurological:      Mental Status: She is alert and oriented to person, place, and time  Psychiatric:         Behavior: Behavior normal          Thought Content:  Thought content normal          Judgment: Judgment normal           Partha Iyer MD  [facility]

## 2021-09-07 NOTE — PATIENT INSTRUCTIONS

## 2021-12-14 ENCOUNTER — HOSPITAL ENCOUNTER (OUTPATIENT)
Dept: RADIOLOGY | Age: 46
Discharge: HOME | End: 2021-12-14
Payer: COMMERCIAL

## 2021-12-14 VITALS — BODY MASS INDEX: 36.8 KG/M2 | WEIGHT: 200 LBS | HEIGHT: 62 IN

## 2021-12-14 DIAGNOSIS — Z12.31 ENCOUNTER FOR SCREENING MAMMOGRAM FOR MALIGNANT NEOPLASM OF BREAST: ICD-10-CM

## 2021-12-14 PROCEDURE — 77063 BREAST TOMOSYNTHESIS BI: CPT

## 2021-12-14 PROCEDURE — 77067 SCR MAMMO BI INCL CAD: CPT

## 2021-12-15 ENCOUNTER — TELEPHONE (OUTPATIENT)
Dept: OBGYN CLINIC | Facility: CLINIC | Age: 46
End: 2021-12-15

## 2022-04-19 DIAGNOSIS — T63.444S ANAPHYLACTIC REACTION TO BEE STING, UNDETERMINED INTENT, SEQUELA: ICD-10-CM

## 2022-04-20 RX ORDER — EPINEPHRINE 0.3 MG/.3ML
INJECTION SUBCUTANEOUS
Qty: 2 EACH | Refills: 0 | Status: SHIPPED | OUTPATIENT
Start: 2022-04-20

## 2022-05-31 DIAGNOSIS — D68.51 FACTOR V LEIDEN MUTATION (HCC): ICD-10-CM

## 2022-09-06 LAB
ALBUMIN SERPL-MCNC: 4 G/DL (ref 3.6–5.1)
ALBUMIN/GLOB SERPL: 1.3 (CALC) (ref 1–2.5)
ALP SERPL-CCNC: 68 U/L (ref 31–125)
ALT SERPL-CCNC: 12 U/L (ref 6–29)
AST SERPL-CCNC: 12 U/L (ref 10–35)
BASOPHILS # BLD AUTO: 30 CELLS/UL (ref 0–200)
BASOPHILS NFR BLD AUTO: 0.4 %
BILIRUB SERPL-MCNC: 0.2 MG/DL (ref 0.2–1.2)
BUN SERPL-MCNC: 15 MG/DL (ref 7–25)
BUN/CREAT SERPL: NORMAL (CALC) (ref 6–22)
CALCIUM SERPL-MCNC: 9.5 MG/DL (ref 8.6–10.2)
CHLORIDE SERPL-SCNC: 103 MMOL/L (ref 98–110)
CHOLEST SERPL-MCNC: 228 MG/DL
CHOLEST/HDLC SERPL: 4.4 (CALC)
CO2 SERPL-SCNC: 31 MMOL/L (ref 20–32)
CREAT SERPL-MCNC: 0.68 MG/DL (ref 0.5–0.99)
EOSINOPHIL # BLD AUTO: 207 CELLS/UL (ref 15–500)
EOSINOPHIL NFR BLD AUTO: 2.8 %
ERYTHROCYTE [DISTWIDTH] IN BLOOD BY AUTOMATED COUNT: 12.2 % (ref 11–15)
GFR/BSA.PRED SERPLBLD CYS-BASED-ARV: 109 ML/MIN/1.73M2
GLOBULIN SER CALC-MCNC: 3 G/DL (CALC) (ref 1.9–3.7)
GLUCOSE SERPL-MCNC: 93 MG/DL (ref 65–99)
HCT VFR BLD AUTO: 40.3 % (ref 35–45)
HCV AB S/CO SERPL IA: 0.08
HCV AB SERPL QL IA: NORMAL
HDLC SERPL-MCNC: 52 MG/DL
HGB BLD-MCNC: 13.4 G/DL (ref 11.7–15.5)
HIV 1+2 AB+HIV1 P24 AG SERPL QL IA: NORMAL
LDLC SERPL CALC-MCNC: 155 MG/DL (CALC)
LYMPHOCYTES # BLD AUTO: 2309 CELLS/UL (ref 850–3900)
LYMPHOCYTES NFR BLD AUTO: 31.2 %
MCH RBC QN AUTO: 31.2 PG (ref 27–33)
MCHC RBC AUTO-ENTMCNC: 33.3 G/DL (ref 32–36)
MCV RBC AUTO: 93.9 FL (ref 80–100)
MONOCYTES # BLD AUTO: 599 CELLS/UL (ref 200–950)
MONOCYTES NFR BLD AUTO: 8.1 %
NEUTROPHILS # BLD AUTO: 4255 CELLS/UL (ref 1500–7800)
NEUTROPHILS NFR BLD AUTO: 57.5 %
NONHDLC SERPL-MCNC: 176 MG/DL (CALC)
PLATELET # BLD AUTO: 320 THOUSAND/UL (ref 140–400)
PMV BLD REES-ECKER: 10 FL (ref 7.5–12.5)
POTASSIUM SERPL-SCNC: 4.3 MMOL/L (ref 3.5–5.3)
PROT SERPL-MCNC: 7 G/DL (ref 6.1–8.1)
RBC # BLD AUTO: 4.29 MILLION/UL (ref 3.8–5.1)
SODIUM SERPL-SCNC: 140 MMOL/L (ref 135–146)
TRIGL SERPL-MCNC: 101 MG/DL
WBC # BLD AUTO: 7.4 THOUSAND/UL (ref 3.8–10.8)

## 2022-09-21 ENCOUNTER — OFFICE VISIT (OUTPATIENT)
Dept: INTERNAL MEDICINE CLINIC | Facility: CLINIC | Age: 47
End: 2022-09-21
Payer: COMMERCIAL

## 2022-09-21 VITALS
TEMPERATURE: 99.5 F | DIASTOLIC BLOOD PRESSURE: 68 MMHG | RESPIRATION RATE: 16 BRPM | BODY MASS INDEX: 38.39 KG/M2 | HEIGHT: 62 IN | OXYGEN SATURATION: 98 % | WEIGHT: 208.6 LBS | SYSTOLIC BLOOD PRESSURE: 132 MMHG | HEART RATE: 77 BPM

## 2022-09-21 DIAGNOSIS — E78.00 PURE HYPERCHOLESTEROLEMIA: ICD-10-CM

## 2022-09-21 DIAGNOSIS — Z00.00 ANNUAL PHYSICAL EXAM: Primary | ICD-10-CM

## 2022-09-21 DIAGNOSIS — E66.01 SEVERE OBESITY WITH BODY MASS INDEX (BMI) OF 35.0 TO 39.9 WITH SERIOUS COMORBIDITY (HCC): ICD-10-CM

## 2022-09-21 DIAGNOSIS — D68.51 FACTOR V LEIDEN MUTATION (HCC): ICD-10-CM

## 2022-09-21 PROCEDURE — 99396 PREV VISIT EST AGE 40-64: CPT | Performed by: INTERNAL MEDICINE

## 2022-09-21 PROCEDURE — 3725F SCREEN DEPRESSION PERFORMED: CPT | Performed by: INTERNAL MEDICINE

## 2022-09-21 NOTE — PATIENT INSTRUCTIONS

## 2022-09-21 NOTE — PROGRESS NOTES
[facility] [place]    NAME: Jessica Snyder  AGE: 55 y o  SEX: female  : 1975     DATE: 2022     Assessment and Plan:     Problem List Items Addressed This Visit        Hematopoietic and Hemostatic    Factor V Leiden mutation ([address] )       Other    Severe obesity (BMI 35 0-39 9) (Chronic)    Relevant Medications    liraglutide (SAXENDA) injection    Other Relevant Orders    CBC and differential    Comprehensive metabolic panel    Lipid Panel with Direct LDL reflex    TSH, 3rd generation with Free T4 reflex    Pure hypercholesterolemia (Chronic)    Relevant Orders    CBC and differential    Comprehensive metabolic panel    Lipid Panel with Direct LDL reflex    TSH, 3rd generation with Free T4 reflex      Other Visit Diagnoses     Annual physical exam    -  Primary          Immunizations and preventive care screenings were discussed with patient today  Appropriate education was printed on patient's after visit summary  Counseling:  Exercise: the importance of regular exercise/physical activity was discussed  Recommend exercise 3-5 times per week for at least 30 minutes  BMI Counseling: Body mass index is 38 15 kg/m²  The BMI is above normal  Nutrition recommendations include decreasing overall calorie intake and reducing intake of saturated fat and trans fat  Exercise recommendations include exercising 3-5 times per week  Pharmacotherapy was ordered for patient to aid in weight loss  Agrees to trying Roena Favorite  Will need to f/u sooner if she is able to start it       Return in about 1 year (around 2023)  Chief Complaint:     Chief Complaint   Patient presents with    Physical Exam      History of Present Illness:     Adult Annual Physical   Patient here for a comprehensive physical exam  The patient reports no problems  Diet and Physical Activity  Diet/Nutrition: high fat diet  Exercise: no formal exercise  Depression Screening  PHQ-2/9 Depression Screening    Little interest or pleasure in doing things: 0 - not at all  Feeling down, depressed, or hopeless: 0 - not at all  PHQ-2 Score: 0  PHQ-2 Interpretation: Negative depression screen       General Health  Sleep: sleeps well  Hearing: normal - bilateral   Vision: goes for regular eye exams and wears contacts  Dental: regular dental visits  /GYN Health  Patient is: premenopausal     Review of Systems:     Review of Systems   Constitutional: Negative for chills, fever and unexpected weight change  HENT: Negative for hearing loss, sinus pressure and sore throat  Respiratory: Negative for cough and shortness of breath  Cardiovascular: Negative for chest pain, palpitations and leg swelling  Gastrointestinal: Negative for abdominal pain, constipation, diarrhea, nausea and vomiting  Genitourinary: Negative for difficulty urinating and menstrual problem  Musculoskeletal: Negative for arthralgias and myalgias  Neurological: Negative for dizziness and headaches        Past Medical History:     Past Medical History:   Diagnosis Date     history     Last Assessed:3/9/2015    Acute pharyngitis     Acute tonsillitis     Depression     Embryonic demise     Last Assessed:2015    Fever     History of early menarche     Hx of migraines     Major depressive disorder, single episode, moderate with peripartum onset ([facility] [address] )     Last Assessed:1/15/2016    Normal delivery     Oral contraceptive prescribed     Pain in right foot     PE (pulmonary thromboembolism) ([facility] [address] [facility] )     Type B influenza       Past Surgical History:     Past Surgical History:   Procedure Laterality Date    MOUTH SURGERY        Social History:     Social History     Socioeconomic History    Marital status: /Civil Union     Spouse name: None    Number of children: None    Years of education: None    Highest education level: None   Occupational History    None Tobacco Use    Smoking status: Never Smoker    Smokeless tobacco: Never Used   Vaping Use    Vaping Use: Never used   Substance and Sexual Activity    Alcohol use: Yes     Comment: social     Drug use: No    Sexual activity: Yes     Partners: Male     Birth control/protection: Male Sterilization, None   Other Topics Concern    None   Social History Narrative    Bereavement    Daily Tea consumption:3 cups a day    Marital conflict     Social Determinants of Health     Financial Resource Strain: Not on file   Food Insecurity: Not on file   Transportation Needs: Not on file   Physical Activity: Not on file   Stress: Not on file   Social Connections: Not on file   Intimate Partner Violence: Not on file   Housing Stability: Not on file      Family History:     Family History   Problem Relation Age of Onset    Heart disease Mother     Hypertension Mother     Atrial fibrillation Mother     Colon cancer Father 77    Diabetes Maternal Grandfather     No Known Problems Son     No Known Problems Son     No Known Problems Son     No Known Problems Paternal Uncle     Heart disease Family     Breast cancer Neg Hx     Ovarian cancer Neg Hx       Current Medications:     Current Outpatient Medications   Medication Sig Dispense Refill    clobetasol (TEMOVATE) 0 05 % cream Apply topically 2 (two) times a day 45 g 0    fluticasone (FLONASE) 50 mcg/act nasal spray 1 spray into each nostril 2 (two) times a day as needed for rhinitis or allergies 16 g 1    liraglutide (SAXENDA) injection Inject 0 1 mL (0 6 mg total) under the skin daily Increase by 0 6 weekly up to 3mg a day 3 mL 1    rivaroxaban (Xarelto) 20 mg tablet Take 1 tablet (20 mg total) by mouth daily 90 tablet 3    EPINEPHrine (EpiPen 2-Adama) 0 3 mg/0 3 mL SOAJ Inject into thigh as needed (Patient not taking: Reported on 9/21/2022) 2 each 0     No current facility-administered medications for this visit  Allergies:      Allergies   Allergen Reactions    Bee Venom Hives      Physical Exam:     /68   Pulse 77   Temp 99 5 °F (37 5 °C)   Resp 16   Ht 5' 2" (1 575 m)   Wt 94 6 kg (208 lb 9 6 oz)   SpO2 98%   BMI 38 15 kg/m²     Physical Exam  Constitutional:       General: She is not in acute distress  Appearance: She is well-developed  She is obese  She is not ill-appearing, toxic-appearing or diaphoretic  HENT:      Head: Normocephalic and atraumatic  Right Ear: External ear normal       Left Ear: External ear normal  There is no impacted cerumen  Eyes:      Conjunctiva/sclera: Conjunctivae normal    Cardiovascular:      Rate and Rhythm: Normal rate and regular rhythm  Heart sounds: Normal heart sounds  No murmur heard  Pulmonary:      Effort: Pulmonary effort is normal  No respiratory distress  Breath sounds: Normal breath sounds  No stridor  No wheezing or rales  Abdominal:      General: There is no distension  Palpations: Abdomen is soft  There is no mass  Tenderness: There is no abdominal tenderness  There is no guarding or rebound  Musculoskeletal:      Cervical back: Neck supple  Right lower leg: No edema  Left lower leg: No edema  Skin:     General: Skin is warm and dry  Neurological:      Mental Status: She is alert and oriented to person, place, and time  Psychiatric:         Mood and Affect: Mood normal          Behavior: Behavior normal          Thought Content:  Thought content normal          Judgment: Judgment normal           Yue Chou MD  [facility]

## 2022-09-23 ENCOUNTER — ANNUAL EXAM (OUTPATIENT)
Dept: OBGYN CLINIC | Facility: MEDICAL CENTER | Age: 47
End: 2022-09-23
Payer: COMMERCIAL

## 2022-09-23 VITALS
DIASTOLIC BLOOD PRESSURE: 70 MMHG | SYSTOLIC BLOOD PRESSURE: 110 MMHG | WEIGHT: 209 LBS | BODY MASS INDEX: 38.46 KG/M2 | HEIGHT: 62 IN

## 2022-09-23 DIAGNOSIS — Z12.31 ENCOUNTER FOR SCREENING MAMMOGRAM FOR MALIGNANT NEOPLASM OF BREAST: ICD-10-CM

## 2022-09-23 DIAGNOSIS — Z01.419 ENCOUNTER FOR GYNECOLOGICAL EXAMINATION WITHOUT ABNORMAL FINDING: Primary | ICD-10-CM

## 2022-09-23 PROCEDURE — S0612 ANNUAL GYNECOLOGICAL EXAMINA: HCPCS | Performed by: CLINICAL NURSE SPECIALIST

## 2022-09-23 NOTE — PROGRESS NOTES
Subjective:        Paulette Garcia is a 55 y o  female  Here for Gynecologic Exam      GYN HPI  Here for annual gyn exam  Regarding menstrual cycle: Patient denies menstrual complaints  Regular monthly menses, not excessive  She denies any abnormal vaginal complaints; and denies any abnormal urinary complaints    Denies changes or concerns r/t breasts  She does performs self breast exams  She reports she generally eats a healthy diet and does exercise regularly  She does get dietary calcium/vit D  She denies any untreated or poorly controlled anxiety or depression sxs  She is sexually active  Contraception: vasectomy  She reports that she does feel safe at home  The following portions of the patient's history were reviewed and updated as appropriate: allergies, current medications, past family history, past medical history, past social history, past surgical history, and problem list       Hereditary Cancer Screening  Family history reviewed and patient does not meet criteria for referral for genetic cancer assessment  Substance Abuse Screening Completed  See hx and flowsheet  Screens Positive for none       HEALTH MAINTENANCE SCREENINGS:    History of abnormal pap: No, Last Papanicolaou test:  05/24/2019  History of abnormal mammogram: No, Last mammogram:  12/14/2021      Review of Systems   Constitutional: Negative for appetite change, chills, fatigue, fever and unexpected weight change  HENT: Negative  Eyes: Negative  Respiratory: Negative for chest tightness and shortness of breath  Cardiovascular: Negative for chest pain and palpitations  Gastrointestinal: Negative for abdominal pain, constipation and vomiting  Endocrine: Negative for cold intolerance and heat intolerance  Genitourinary:        As per HPI   Musculoskeletal: Negative for back pain, joint swelling and neck pain  Skin: Negative for color change and rash     Neurological: Negative for dizziness, weakness and numbness  Hematological: Does not bruise/bleed easily  Psychiatric/Behavioral: Negative  Objective:  /70 (BP Location: Left arm, Patient Position: Sitting, Cuff Size: Large)   Ht 5' 2" (1 575 m)   Wt 94 8 kg (209 lb)   LMP 09/08/2022 (Exact Date)   BMI 38 23 kg/m²        Physical Exam  Constitutional:       General: She is not in acute distress  Appearance: Normal appearance  Genitourinary:      Vulva and rectum normal       No lesions in the vagina  Right Labia: No rash or lesions  Left Labia: No lesions or rash  No vaginal discharge, erythema, tenderness or bleeding  Right Adnexa: not tender and no mass present  Left Adnexa: not tender and no mass present  No cervical motion tenderness, discharge or friability  Uterus is not enlarged or tender  No urethral prolapse present  Pelvic exam was performed with patient in the lithotomy position  Breasts: Breasts are symmetrical       Right: No inverted nipple, mass, nipple discharge, skin change or tenderness  Left: No inverted nipple, mass, nipple discharge, skin change or tenderness  HENT:      Head: Normocephalic and atraumatic  Cardiovascular:      Rate and Rhythm: Normal rate  Heart sounds: No murmur heard  Pulmonary:      Effort: Pulmonary effort is normal       Breath sounds: Normal breath sounds  Abdominal:      General: There is no distension  Palpations: Abdomen is soft  Tenderness: There is no abdominal tenderness  Musculoskeletal:         General: Normal range of motion  Cervical back: Normal range of motion  Lymphadenopathy:      Cervical: No cervical adenopathy  Neurological:      Mental Status: She is alert and oriented to person, place, and time  Skin:     General: Skin is warm and dry  Psychiatric:         Mood and Affect: Mood normal          Behavior: Behavior normal    Vitals reviewed                 Assessment/Plan: ANNUAL GYN EXAM- Primary  Annual GYN examination completed today  Risk prevention and anticipatory guidance provided including:   Pap/breast screenings- see below   Risk for hereditary cancers: Family history reviewed and patient does not qualify for referral for genetics consult/testing  Referral to genetics oncology offered as indicated  , Colon Cancer Screening: She Is up to date on screening; Next due 2025   Calcium and vitamin D supplementation   Risk factors for lipid, diabetes and thryroid screening, ordered if needed   Dietary and lifestyle recommendations based on her age and weight  body mass index is 38 23 kg/m²  Catrina Cantu PHQ-2 depression screen completed  Reviewed and interventions recommended if needed   Tobacco and alcohol use, intervention ordered if applicable  Cervical cancer screening  Previous pap smears and ASCCP screening guidelines have been reviewed  Pap smear is not indicated at this time  Contraception   N/A- s/p permanent sterilization surgery    STI Screening  STI screening is declined  STI prevention discussed with use of condoms  Breast exam and breast cancer screening  Breast exam was done; , Reviewed recommendation for yearly screening mammogram (as per ACOG, ACS, and [address] [place] [address] Departments), however, also made her aware that there are other professional organizations that may recommend deferring mammograms until age 48 or screening every other year  CBE should still be done yearly, but may miss some cancers and alone is not as effective as breast imaging  Supplemental testing may also be indicated based on lifetime risk for breast cancer as done by [facility] imaging , She is up to date with screening;  Next due 12/22    Problem List Items Addressed This Visit     Encounter for gynecological examination without abnormal finding - Primary      Other Visit Diagnoses     Encounter for screening mammogram for malignant neoplasm of breast        Relevant Orders    Mammo screening bilateral w 3d & cad          Orders Placed This Encounter   Procedures    Mammo screening bilateral w 3d & cad

## 2022-09-23 NOTE — PROGRESS NOTES
Patient presents for a routine annual visit  Menarche- 15 Y/O  Last Pap Smear- 2019  LMP- 22  Birth control- partner vasectomy   Mammogram- 2021   Colonoscopy - 21 RTO 5 yr    Non smoker  Social drinker  Currently sexually active  No family history of uterine, ovarian, cervical or breast cancer   Father had colon cancer  No concerns/questions for today's visit

## 2022-10-18 ENCOUNTER — TELEPHONE (OUTPATIENT)
Dept: INTERNAL MEDICINE CLINIC | Facility: CLINIC | Age: 47
End: 2022-10-18

## 2022-10-19 NOTE — TELEPHONE ENCOUNTER
Spoke to [employer]'s Feedjit  PA needs to be completed on [employer]  prompt[employer] com  per insurance  PA has been started and awaiting determination  Once received, we can contact the patient

## 2022-10-19 NOTE — TELEPHONE ENCOUNTER
Len Rosas,    Are you able to assist with this PA as far as any info we can provide to insurance  Thank you      Radha

## 2022-10-25 ENCOUNTER — TELEPHONE (OUTPATIENT)
Dept: INTERNAL MEDICINE CLINIC | Facility: CLINIC | Age: 47
End: 2022-10-25

## 2022-10-25 DIAGNOSIS — E66.01 SEVERE OBESITY WITH BODY MASS INDEX (BMI) OF 35.0 TO 39.9 WITH SERIOUS COMORBIDITY (HCC): Primary | ICD-10-CM

## 2022-10-25 RX ORDER — PEN NEEDLE, DIABETIC 32 GX 1/4"
NEEDLE, DISPOSABLE MISCELLANEOUS DAILY
Qty: 100 EACH | Refills: 1 | Status: SHIPPED | OUTPATIENT
Start: 2022-10-25 | End: 2022-11-05

## 2022-11-04 DIAGNOSIS — E66.01 SEVERE OBESITY WITH BODY MASS INDEX (BMI) OF 35.0 TO 39.9 WITH SERIOUS COMORBIDITY (HCC): ICD-10-CM

## 2022-11-05 RX ORDER — PEN NEEDLE, DIABETIC 32 GX 1/4"
NEEDLE, DISPOSABLE MISCELLANEOUS DAILY
Qty: 100 EACH | Refills: 0 | Status: SHIPPED | OUTPATIENT
Start: 2022-11-05 | End: 2022-11-10

## 2022-11-05 RX ORDER — PEN NEEDLE, DIABETIC 32 GX 1/4"
NEEDLE, DISPOSABLE MISCELLANEOUS DAILY
Qty: 100 EACH | Refills: 1 | Status: SHIPPED | OUTPATIENT
Start: 2022-11-05 | End: 2022-11-05 | Stop reason: SDUPTHER

## 2022-11-30 DIAGNOSIS — E66.01 SEVERE OBESITY WITH BODY MASS INDEX (BMI) OF 35.0 TO 39.9 WITH SERIOUS COMORBIDITY (HCC): ICD-10-CM

## 2022-11-30 RX ORDER — PEN NEEDLE, DIABETIC 32 GX 1/6"
NEEDLE, DISPOSABLE MISCELLANEOUS
Qty: 100 EACH | Refills: 1 | Status: SHIPPED | OUTPATIENT
Start: 2022-11-30

## 2022-12-23 ENCOUNTER — HOSPITAL ENCOUNTER (OUTPATIENT)
Dept: RADIOLOGY | Age: 47
Discharge: HOME | End: 2022-12-23

## 2022-12-23 VITALS — HEIGHT: 62 IN | WEIGHT: 209 LBS | BODY MASS INDEX: 38.46 KG/M2

## 2022-12-23 DIAGNOSIS — Z12.31 ENCOUNTER FOR SCREENING MAMMOGRAM FOR MALIGNANT NEOPLASM OF BREAST: ICD-10-CM

## 2023-03-06 DIAGNOSIS — E66.01 SEVERE OBESITY WITH BODY MASS INDEX (BMI) OF 35.0 TO 39.9 WITH SERIOUS COMORBIDITY (HCC): ICD-10-CM

## 2023-03-06 RX ORDER — LIRAGLUTIDE 6 MG/ML
3 INJECTION, SOLUTION SUBCUTANEOUS DAILY
Qty: 15 ML | Refills: 5 | Status: SHIPPED | OUTPATIENT
Start: 2023-03-06

## 2023-05-06 DIAGNOSIS — J30.2 SEASONAL ALLERGIES: ICD-10-CM

## 2023-05-06 RX ORDER — FLUTICASONE PROPIONATE 50 MCG
1 SPRAY, SUSPENSION (ML) NASAL 2 TIMES DAILY PRN
Qty: 48 ML | Refills: 1 | Status: SHIPPED | OUTPATIENT
Start: 2023-05-06

## 2023-05-19 DIAGNOSIS — T63.444S ANAPHYLACTIC REACTION TO BEE STING, UNDETERMINED INTENT, SEQUELA: ICD-10-CM

## 2023-05-19 RX ORDER — EPINEPHRINE 0.3 MG/.3ML
INJECTION SUBCUTANEOUS
Qty: 2 EACH | Refills: 0 | Status: SHIPPED | OUTPATIENT
Start: 2023-05-19

## 2023-05-28 DIAGNOSIS — D68.51 FACTOR V LEIDEN MUTATION (HCC): ICD-10-CM

## 2023-05-29 RX ORDER — RIVAROXABAN 20 MG/1
TABLET, FILM COATED ORAL
Qty: 90 TABLET | Refills: 3 | Status: SHIPPED | OUTPATIENT
Start: 2023-05-29

## 2023-08-30 DIAGNOSIS — E66.01 SEVERE OBESITY WITH BODY MASS INDEX (BMI) OF 35.0 TO 39.9 WITH SERIOUS COMORBIDITY (HCC): ICD-10-CM

## 2023-08-31 DIAGNOSIS — E66.01 SEVERE OBESITY WITH BODY MASS INDEX (BMI) OF 35.0 TO 39.9 WITH SERIOUS COMORBIDITY (HCC): Primary | Chronic | ICD-10-CM

## 2023-08-31 RX ORDER — LIRAGLUTIDE 6 MG/ML
3 INJECTION, SOLUTION SUBCUTANEOUS DAILY
Refills: 5 | OUTPATIENT
Start: 2023-08-31

## 2023-08-31 NOTE — TELEPHONE ENCOUNTER
Inform the patient Cecil Benitez is backordered so I am sending KHUKSY. I am not sure if this is available either. It is a weekly injection. It will be 2.4mg a week.

## 2023-09-14 ENCOUNTER — TELEPHONE (OUTPATIENT)
Age: 48
End: 2023-09-14

## 2023-09-14 NOTE — TELEPHONE ENCOUNTER
Beatris from cover my meds called and she stated that there was a prior auth started on wegovy for patient but it was never completed or sent to the insurance. Beatris wanted to know if this was going to be pursued. Please call back  Cover my meds at [phone]. Ref. saez  is [id number]    Ivanna Perkins stated that as long as you have the ref key any representative can help you.     Twan Garcia

## 2023-09-18 ENCOUNTER — RA CDI HCC (OUTPATIENT)
Dept: OTHER | Facility: HOSPITAL | Age: 48
End: 2023-09-18

## 2023-09-19 LAB
ALBUMIN SERPL-MCNC: 4.2 G/DL (ref 3.6–5.1)
ALBUMIN/GLOB SERPL: 1.6 (CALC) (ref 1–2.5)
ALP SERPL-CCNC: 65 U/L (ref 31–125)
ALT SERPL-CCNC: 9 U/L (ref 6–29)
AST SERPL-CCNC: 11 U/L (ref 10–35)
BASOPHILS # BLD AUTO: 32 CELLS/UL (ref 0–200)
BASOPHILS NFR BLD AUTO: 0.5 %
BILIRUB SERPL-MCNC: 0.4 MG/DL (ref 0.2–1.2)
BUN SERPL-MCNC: 10 MG/DL (ref 7–25)
BUN/CREAT SERPL: NORMAL (CALC) (ref 6–22)
CALCIUM SERPL-MCNC: 9.8 MG/DL (ref 8.6–10.2)
CHLORIDE SERPL-SCNC: 104 MMOL/L (ref 98–110)
CHOLEST SERPL-MCNC: 214 MG/DL
CHOLEST/HDLC SERPL: 4.6 (CALC)
CO2 SERPL-SCNC: 29 MMOL/L (ref 20–32)
CREAT SERPL-MCNC: 0.76 MG/DL (ref 0.5–0.99)
EOSINOPHIL # BLD AUTO: 132 CELLS/UL (ref 15–500)
EOSINOPHIL NFR BLD AUTO: 2.1 %
ERYTHROCYTE [DISTWIDTH] IN BLOOD BY AUTOMATED COUNT: 12 % (ref 11–15)
GFR/BSA.PRED SERPLBLD CYS-BASED-ARV: 97 ML/MIN/1.73M2
GLOBULIN SER CALC-MCNC: 2.6 G/DL (CALC) (ref 1.9–3.7)
GLUCOSE SERPL-MCNC: 85 MG/DL (ref 65–99)
HCT VFR BLD AUTO: 40.5 % (ref 35–45)
HDLC SERPL-MCNC: 47 MG/DL
HGB BLD-MCNC: 13.5 G/DL (ref 11.7–15.5)
LDLC SERPL CALC-MCNC: 149 MG/DL (CALC)
LYMPHOCYTES # BLD AUTO: 2054 CELLS/UL (ref 850–3900)
LYMPHOCYTES NFR BLD AUTO: 32.6 %
MCH RBC QN AUTO: 31.4 PG (ref 27–33)
MCHC RBC AUTO-ENTMCNC: 33.3 G/DL (ref 32–36)
MCV RBC AUTO: 94.2 FL (ref 80–100)
MONOCYTES # BLD AUTO: 523 CELLS/UL (ref 200–950)
MONOCYTES NFR BLD AUTO: 8.3 %
NEUTROPHILS # BLD AUTO: 3560 CELLS/UL (ref 1500–7800)
NEUTROPHILS NFR BLD AUTO: 56.5 %
NONHDLC SERPL-MCNC: 167 MG/DL (CALC)
PLATELET # BLD AUTO: 322 THOUSAND/UL (ref 140–400)
PMV BLD REES-ECKER: 9.8 FL (ref 7.5–12.5)
POTASSIUM SERPL-SCNC: 4.4 MMOL/L (ref 3.5–5.3)
PROT SERPL-MCNC: 6.8 G/DL (ref 6.1–8.1)
RBC # BLD AUTO: 4.3 MILLION/UL (ref 3.8–5.1)
SODIUM SERPL-SCNC: 139 MMOL/L (ref 135–146)
TRIGL SERPL-MCNC: 75 MG/DL
TSH SERPL-ACNC: 1.2 MIU/L
WBC # BLD AUTO: 6.3 THOUSAND/UL (ref 3.8–10.8)

## 2023-09-25 ENCOUNTER — OFFICE VISIT (OUTPATIENT)
Dept: INTERNAL MEDICINE CLINIC | Facility: CLINIC | Age: 48
End: 2023-09-25
Payer: COMMERCIAL

## 2023-09-25 VITALS
HEIGHT: 62 IN | HEART RATE: 77 BPM | BODY MASS INDEX: 33.75 KG/M2 | WEIGHT: 183.4 LBS | SYSTOLIC BLOOD PRESSURE: 120 MMHG | DIASTOLIC BLOOD PRESSURE: 76 MMHG | OXYGEN SATURATION: 98 %

## 2023-09-25 DIAGNOSIS — L40.9 PSORIASIS: ICD-10-CM

## 2023-09-25 DIAGNOSIS — D68.51 FACTOR V LEIDEN MUTATION (HCC): ICD-10-CM

## 2023-09-25 DIAGNOSIS — Z00.00 ANNUAL PHYSICAL EXAM: Primary | ICD-10-CM

## 2023-09-25 DIAGNOSIS — Z12.31 BREAST CANCER SCREENING BY MAMMOGRAM: ICD-10-CM

## 2023-09-25 PROCEDURE — 99396 PREV VISIT EST AGE 40-64: CPT | Performed by: INTERNAL MEDICINE

## 2023-09-25 RX ORDER — CLOBETASOL PROPIONATE 0.5 MG/G
CREAM TOPICAL 2 TIMES DAILY
Qty: 60 G | Refills: 0 | Status: SHIPPED | OUTPATIENT
Start: 2023-09-25

## 2023-09-25 NOTE — PROGRESS NOTES
[address] [facility]    NAME: Hitesh Vale  AGE: 52 y.o. SEX: female  : 1975     DATE: 2023     Assessment and Plan:     Problem List Items Addressed This Visit        Hematopoietic and Hemostatic    Factor V Leiden mutation ([facility])     Continue Xarelto        Other Visit Diagnoses     Annual physical exam    -  Primary    Psoriasis        Relevant Medications    clobetasol (TEMOVATE) 0.05 % cream    Breast cancer screening by mammogram        Relevant Orders    Mammo screening bilateral w 3d & cad          Immunizations and preventive care screenings were discussed with patient today. Appropriate education was printed on patient's after visit summary. Counseling:  Exercise: the importance of regular exercise/physical activity was discussed. Recommend exercise 3-5 times per week for at least 30 minutes. BMI Counseling: Body mass index is 33.54 kg/m². The BMI is above normal. Pharmacotherapy was ordered to help aid in weight loss. Depression Screening and Follow-up Plan: Patient was screened for depression during today's encounter. They screened negative with a PHQ-2 score of 0. Return in about 1 year (around 2024) for Annual physical.     Chief Complaint:     Chief Complaint   Patient presents with   • Annual Exam      History of Present Illness:     Adult Annual Physical   Patient here for a comprehensive physical exam. The patient reports no problems. Diet and Physical Activity  Diet/Nutrition: well balanced diet. Exercise: 2-3 times a week in the gym. Depression Screening  PHQ-2/9 Depression Screening    Little interest or pleasure in doing things: 0 - not at all  Feeling down, depressed, or hopeless: 0 - not at all  PHQ-2 Score: 0  PHQ-2 Interpretation: Negative depression screen       General Health  Sleep: sleeps well. Hearing: normal - bilateral.  Vision: goes for regular eye exams. Dental: regular dental visits. /GYN Health  Patient is: perimenopausal     Review of Systems:     Review of Systems   HENT: Negative for nosebleeds. Respiratory: Negative for shortness of breath. Cardiovascular: Negative for chest pain and palpitations. Gastrointestinal: Positive for constipation (relieved by Colace). Negative for blood in stool, diarrhea, nausea and vomiting. Genitourinary: Negative for difficulty urinating and hematuria. Neurological: Negative for dizziness and headaches. Hematological: Does not bruise/bleed easily.       Past Medical History:     Past Medical History:   Diagnosis Date   •  history     Last Assessed:3/9/2015   • Acute pharyngitis    • Acute tonsillitis    • Depression    • Embryonic demise     Last Assessed:2015   • Fever    • History of early menarche    • Hx of migraines    • Major depressive disorder, single episode, moderate with peripartum onset ([address])     Last Assessed:1/15/2016   • Normal delivery    • Oral contraceptive prescribed    • Pain in right foot    • PE (pulmonary thromboembolism) ([address])    • Type B influenza       Past Surgical History:     Past Surgical History:   Procedure Laterality Date   • MOUTH SURGERY        Social History:     Social History     Socioeconomic History   • Marital status: /Civil Union     Spouse name: None   • Number of children: None   • Years of education: None   • Highest education level: None   Occupational History   • None   Tobacco Use   • Smoking status: Never   • Smokeless tobacco: Never   Vaping Use   • Vaping Use: Never used   Substance and Sexual Activity   • Alcohol use: Yes     Comment: social    • Drug use: No   • Sexual activity: Yes     Partners: Male     Birth control/protection: Male Sterilization, None   Other Topics Concern   • None   Social History Narrative    Bereavement    Daily Tea consumption:3 cups a day    Marital conflict     Social Determinants of Health     Financial Resource Strain: Not on file   Food Insecurity: Not on file   Transportation Needs: Not on file   Physical Activity: Not on file   Stress: Not on file   Social Connections: Not on file   Intimate Partner Violence: Not on file   Housing Stability: Not on file      Family History:     Family History   Problem Relation Age of Onset   • Heart disease Mother    • Hypertension Mother    • Atrial fibrillation Mother    • Colon cancer Father 77   • Diabetes Maternal Grandfather    • No Known Problems Son    • No Known Problems Son    • No Known Problems Son    • No Known Problems Paternal Uncle    • Heart disease Family    • Breast cancer Neg Hx    • Ovarian cancer Neg Hx       Current Medications:     Current Outpatient Medications   Medication Sig Dispense Refill   • clobetasol (TEMOVATE) 0.05 % cream Apply topically 2 (two) times a day 60 g 0   • EPINEPHrine (EPIPEN) 0.3 mg/0.3 mL SOAJ INJECT INTO THIGH AS NEEDED 2 each 0   • fluticasone (FLONASE) 50 mcg/act nasal spray 1 SPRAY INTO EACH NOSTRIL 2 (TWO) TIMES A DAY AS NEEDED FOR RHINITIS OR ALLERGIES 48 mL 1   • NovoFine Plus Pen Needle 32G X 4 MM MISC USE IN THE MORNING 100 each 1   • Semaglutide-Weight Management (WEGOVY) 2.4 MG/0.75ML Inject 0.75 mL (2.4 mg total) under the skin once a week 9 mL 0   • Xarelto 20 MG tablet TAKE 1 TABLET BY MOUTH EVERY DAY 90 tablet 3     No current facility-administered medications for this visit. Allergies: Allergies   Allergen Reactions   • Bee Venom Hives      Physical Exam:     /76 (BP Location: Left arm, Patient Position: Sitting, Cuff Size: Standard)   Pulse 77   Ht 5' 2" (1.575 m)   Wt 83.2 kg (183 lb 6.4 oz)   SpO2 98%   BMI 33.54 kg/m²     Physical Exam  Constitutional:       General: She is not in acute distress. Appearance: She is well-developed. She is not ill-appearing or diaphoretic. HENT:      Head: Normocephalic and atraumatic. Right Ear: External ear normal. There is no impacted cerumen. Left Ear: External ear normal. There is no impacted cerumen. Eyes:      Conjunctiva/sclera: Conjunctivae normal.   Cardiovascular:      Rate and Rhythm: Normal rate and regular rhythm. Heart sounds: Normal heart sounds. No murmur heard. Pulmonary:      Effort: Pulmonary effort is normal. No respiratory distress. Breath sounds: Normal breath sounds. No stridor. No wheezing or rales. Abdominal:      General: There is no distension. Palpations: Abdomen is soft. There is no mass. Tenderness: There is no abdominal tenderness. There is no guarding or rebound. Musculoskeletal:      Cervical back: Neck supple. Right lower leg: No edema. Left lower leg: No edema. Skin:     Findings: Erythema (left elbow) present. Neurological:      Mental Status: She is alert and oriented to person, place, and time. Psychiatric:         Behavior: Behavior normal.         Thought Content:  Thought content normal.         Judgment: Judgment normal.          Lashawn Coyle MD  [facility]

## 2023-10-31 ENCOUNTER — ANNUAL EXAM (OUTPATIENT)
Dept: OBGYN CLINIC | Facility: MEDICAL CENTER | Age: 48
End: 2023-10-31

## 2023-10-31 VITALS
SYSTOLIC BLOOD PRESSURE: 112 MMHG | HEIGHT: 62 IN | BODY MASS INDEX: 34.08 KG/M2 | WEIGHT: 185.2 LBS | DIASTOLIC BLOOD PRESSURE: 84 MMHG

## 2023-10-31 DIAGNOSIS — Z11.51 SCREENING FOR HPV (HUMAN PAPILLOMAVIRUS): ICD-10-CM

## 2023-10-31 DIAGNOSIS — Z01.419 ROUTINE GYNECOLOGICAL EXAMINATION: Primary | ICD-10-CM

## 2023-10-31 DIAGNOSIS — Z12.31 ENCOUNTER FOR SCREENING MAMMOGRAM FOR MALIGNANT NEOPLASM OF BREAST: ICD-10-CM

## 2023-10-31 PROCEDURE — G0145 SCR C/V CYTO,THINLAYER,RESCR: HCPCS | Performed by: PHYSICIAN ASSISTANT

## 2023-10-31 PROCEDURE — G0476 HPV COMBO ASSAY CA SCREEN: HCPCS | Performed by: PHYSICIAN ASSISTANT

## 2023-10-31 NOTE — PROGRESS NOTES
Assessment   50 y.o. [id number] presenting for annual exam.     Plan   Diagnoses and all orders for this visit:    Routine gynecological examination  -     Liquid-based pap, screening    Normal findings on routine exam.  Encouraged 150 min of exercise per week. Reviewed balanced diet. Multivitamin encouraged   Breast awareness/SBE encouraged    We reviewed common signs and sx perimenopause as well as expectant management. Aware of sx to report. Current prolonged menses. She will call if bleeding is persistent or with any recurrence of heavy/prolonged bleeding. Encounter for screening mammogram for malignant neoplasm of breast  -     Mammo screening bilateral w 3d & cad; Future    Screening for HPV (human papillomavirus)  -     Liquid-based pap, screening        Pap - done today   Mammo slip given    Colonoscopy due due 2026      RTO one year for yearly exam or sooner as needed. __________________________________________________________________    Subjective     Paulette Cardozo Yosef is a 50 y.o. [id number] presenting for annual exam.     Recently started on Wegovy for weight loss. She and  attend [facility] for about an hour 2 x per week. She is currently the leader in their step challenge! Works as a day shift nurse at [address] Takes Xarelto due to hx of  PE.     SCREENING  Last Pap:  05/24/2019 NILM/hpv neg   Last Mammo: 12/23/2022 BIRADS 1 - Negative  Last Colonoscopy: 06/02/2021 5 year recall     GYN  Menarche age 15  Periods are regular, monthly, lasting 5 days. Current period has lasted longer than typical but is very light and tapering. Dysmenorrhea:mild - not bothersome. Cyclic symptoms include none  Menopausal sx: denies     Sexually active: Yes - single partner -   Concerns: denies pain, bleeding, dryness   Contraception: 's vasectomy    Hx Abnormal pap: denies  We reviewed ASCCP guidelines for Pap testing today.     Denies vaginal discharge, itching, odor, dyspareunia, pelvic pain and vulvar/vaginal symptoms    OB         Complaints: denies   Denies urgency, frequency, hematuria, leakage / change in stream, difficulty urinating. BREAST  Complaints: denies   Denies: breast lump, breast tenderness, nipple discharge, skin color change, and skin lesion(s)    Pertinent Family Hx:   Family hx of breast cancer: no  Family hx of ovarian cancer: no  Family hx of colon cancer: father (dx late 62s)       GENERAL  PMH reviewed/updated and is as below.      Past Medical History:   Diagnosis Date     history     Last Assessed:3/9/2015    Acute pharyngitis     Acute tonsillitis     Depression     Embryonic demise     Last Assessed:2015    Fever     History of early menarche     Hx of migraines     Major depressive disorder, single episode, moderate with peripartum onset (HCC)     Last Assessed:1/15/2016    Normal delivery     Oral contraceptive prescribed     Pain in right foot     PE (pulmonary thromboembolism) ([address])     Type B influenza        Past Surgical History:   Procedure Laterality Date    MOUTH SURGERY           Current Outpatient Medications:     clobetasol (TEMOVATE) 0.05 % cream, Apply topically 2 (two) times a day, Disp: 60 g, Rfl: 0    EPINEPHrine (EPIPEN) 0.3 mg/0.3 mL SOAJ, INJECT INTO THIGH AS NEEDED, Disp: 2 each, Rfl: 0    fluticasone (FLONASE) 50 mcg/act nasal spray, 1 SPRAY INTO EACH NOSTRIL 2 (TWO) TIMES A DAY AS NEEDED FOR RHINITIS OR ALLERGIES, Disp: 48 mL, Rfl: 1    NovoFine Plus Pen Needle 32G X 4 MM MISC, USE IN THE MORNING, Disp: 100 each, Rfl: 1    Semaglutide-Weight Management (WEGOVY) 2.4 MG/0.75ML, Inject 0.75 mL (2.4 mg total) under the skin once a week, Disp: 9 mL, Rfl: 0    Xarelto 20 MG tablet, TAKE 1 TABLET BY MOUTH EVERY DAY, Disp: 90 tablet, Rfl: 3    Allergies   Allergen Reactions    Bee Venom Hives       Social History     Socioeconomic History    Marital status: /Civil Union     Spouse name: Not on file Number of children: Not on file    Years of education: Not on file    Highest education level: Not on file   Occupational History    Not on file   Tobacco Use    Smoking status: Never    Smokeless tobacco: Never   Vaping Use    Vaping Use: Never used   Substance and Sexual Activity    Alcohol use: Yes     Comment: social     Drug use: No    Sexual activity: Yes     Partners: Male     Birth control/protection: Male Sterilization, None   Other Topics Concern    Not on file   Social History Narrative    Bereavement    Daily Tea consumption:3 cups a day    Marital conflict     Social Determinants of Health     Financial Resource Strain: Not on file   Food Insecurity: Not on file   Transportation Needs: Not on file   Physical Activity: Not on file   Stress: Not on file   Social Connections: Not on file   Intimate Partner Violence: Not on file   Housing Stability: Not on file       Review of Systems     Constitutional: Negative. Respiratory: Negative. Cardiovascular: Negative   Gastrointestinal: Negative   Breasts: As noted above. Genitourinary: As noted above. Psychiatric: Negative     Objective      /84 (BP Location: Left arm, Patient Position: Sitting, Cuff Size: Standard)   Ht 5' 2.25" (1.581 m)   Wt 84 kg (185 lb 3.2 oz)   LMP 10/22/2023   BMI 33.60 kg/m²     Physical Examination:    Patient appears well and is not in distress  Breasts are symmetrical without mass, tenderness, nipple discharge, skin changes or adenopathy. Abdomen is soft and nontender without masses. External genitals are normal without lesions or rashes. Urethral meatus and urethra are normal  Bladder is normal to palpation  Vagina is normal without discharge. Menstrual blood present. Cervix is without discharge or lesion. Uterus is normal, mobile, nontender without palpable mass. Adnexa are normal, nontender, without palpable mass.

## 2023-11-01 LAB
HPV HR 12 DNA CVX QL NAA+PROBE: NEGATIVE
HPV16 DNA CVX QL NAA+PROBE: NEGATIVE
HPV18 DNA CVX QL NAA+PROBE: NEGATIVE

## 2023-11-06 ENCOUNTER — TELEPHONE (OUTPATIENT)
Dept: OBGYN CLINIC | Facility: CLINIC | Age: 48
End: 2023-11-06

## 2023-11-06 LAB
LAB AP GYN PRIMARY INTERPRETATION: NORMAL
Lab: NORMAL

## 2023-11-06 NOTE — TELEPHONE ENCOUNTER
----- Message from Jennifer Ambrocio PA-C sent at 11/6/2023  1:44 PM EST -----  Please notify pap and hpv were normal / neg

## 2023-11-13 DIAGNOSIS — D68.51 FACTOR V LEIDEN MUTATION (HCC): ICD-10-CM

## 2023-11-13 NOTE — TELEPHONE ENCOUNTER
Xarelto needs to be sent to [facility] delivery.  It could not be transferred on their end so they need us to resent to [address]

## 2023-11-13 NOTE — TELEPHONE ENCOUNTER
Patients  is having issues with Patients Xarelto Script:     Please call patient's insurance [facility] regarding this.   [phone] Case rios Hawkins EXT 2011

## 2023-11-16 NOTE — TELEPHONE ENCOUNTER
Pt's  called in to check on this medication. I told him we sent it to [facility] mail order on 11/13.

## 2023-11-24 ENCOUNTER — TELEPHONE (OUTPATIENT)
Dept: INTERNAL MEDICINE CLINIC | Facility: CLINIC | Age: 48
End: 2023-11-24

## 2023-11-24 NOTE — TELEPHONE ENCOUNTER
PA for wegovy submitted. Form received from [employer]. Form completed and faxed to number provided. Clinical questions answered. Office notes sent. Awaiting determination.

## 2024-02-14 ENCOUNTER — OFFICE VISIT (OUTPATIENT)
Dept: INTERNAL MEDICINE CLINIC | Facility: CLINIC | Age: 49
End: 2024-02-14
Payer: COMMERCIAL

## 2024-02-14 VITALS
OXYGEN SATURATION: 95 % | SYSTOLIC BLOOD PRESSURE: 108 MMHG | BODY MASS INDEX: 33.57 KG/M2 | WEIGHT: 185 LBS | TEMPERATURE: 100.9 F | HEART RATE: 112 BPM | DIASTOLIC BLOOD PRESSURE: 76 MMHG

## 2024-02-14 DIAGNOSIS — R68.89 FLU-LIKE SYMPTOMS: Primary | ICD-10-CM

## 2024-02-14 DIAGNOSIS — D68.51 FACTOR V LEIDEN MUTATION (HCC): ICD-10-CM

## 2024-02-14 LAB
SARS-COV-2 AG UPPER RESP QL IA: NEGATIVE
SL AMB POCT RAPID FLU A: NEGATIVE
SL AMB POCT RAPID FLU B: NEGATIVE
VALID CONTROL: NORMAL

## 2024-02-14 PROCEDURE — 87811 SARS-COV-2 COVID19 W/OPTIC: CPT | Performed by: INTERNAL MEDICINE

## 2024-02-14 PROCEDURE — 87804 INFLUENZA ASSAY W/OPTIC: CPT | Performed by: INTERNAL MEDICINE

## 2024-02-14 PROCEDURE — 99213 OFFICE O/P EST LOW 20 MIN: CPT | Performed by: INTERNAL MEDICINE

## 2024-02-14 NOTE — ASSESSMENT & PLAN NOTE
-Point-of-care rapid COVID and point-of-care flu negative  -Patient's current illness likely secondary to a viral syndrome  -Recommended Tylenol as needed for pain and fever  -Mucinex DM for cough and congestion  -Encouraged the patient to get plenty of rest and stay well-hydrated

## 2024-02-14 NOTE — LETTER
February 14, 2024     Patient: Paulette Borden  YOB: 1975  Date of Visit: 2/14/2024      To Whom it May Concern:    Paulette Borden is under my professional care. Paulette was seen in my office on 2/14/2024. Paulette may return to work on 2/16/24 .    If you have any questions or concerns, please don't hesitate to call.         Sincerely,          Axel Cowart MD

## 2024-02-14 NOTE — PROGRESS NOTES
Name: Paulette Borden      : 1975      MRN: [id number]  Encounter Provider: Axel Cowart MD  Encounter Date: 2024   [facility]    Assessment & Plan     1. Flu-like symptoms  Assessment & Plan:  -Point-of-care rapid COVID and point-of-care flu negative  -Patient's current illness likely secondary to a viral syndrome  -Recommended Tylenol as needed for pain and fever  -Mucinex DM for cough and congestion  -Encouraged the patient to get plenty of rest and stay well-hydrated    Orders:  -     POCT Rapid Covid Ag  -     POCT rapid flu A and B    2. Factor V Leiden mutation (HCC)  Assessment & Plan:  -History of PE  -Currently on Xarelto for AC           Subjective      HPI  Patient presents today as an acute visit.  She complains of dry cough, fever up to 101.2 °F, body aches and fatigue.  Her symptoms started yesterday.  She reports she was recently treated for strep throat 2 weeks ago and has finished her course of amoxicillin.  She denies any sore throat.  Patient endorses sick contacts at home.  Her  was recently treated for an acute sinusitis and her son has mild upper respiratory symptoms.      All other systems negative except for pertinent findings noted in HPI.       Current Outpatient Medications on File Prior to Visit   Medication Sig   • clobetasol (TEMOVATE) 0.05 % cream Apply topically 2 (two) times a day   • EPINEPHrine (EPIPEN) 0.3 mg/0.3 mL SOAJ INJECT INTO THIGH AS NEEDED   • fluticasone (FLONASE) 50 mcg/act nasal spray 1 SPRAY INTO EACH NOSTRIL 2 (TWO) TIMES A DAY AS NEEDED FOR RHINITIS OR ALLERGIES   • NovoFine Plus Pen Needle 32G X 4 MM MISC USE IN THE MORNING   • rivaroxaban (Xarelto) 20 mg tablet Take 1 tablet (20 mg total) by mouth daily   • Semaglutide-Weight Management (WEGOVY) 2.4 MG/0.75ML Inject 0.75 mL (2.4 mg total) under the skin once a week       Objective     /76 (BP Location: Right arm, Patient Position:  Sitting, Cuff Size: Large)   Pulse (!) 112   Temp (!) 100.9 °F (38.3 °C)   Wt 83.9 kg (185 lb)   SpO2 95%   BMI 33.57 kg/m²     BP Readings from Last 3 Encounters:   02/14/24 108/76   10/31/23 112/84   09/25/23 120/76        Wt Readings from Last 3 Encounters:   02/14/24 83.9 kg (185 lb)   10/31/23 84 kg (185 lb 3.2 oz)   09/25/23 83.2 kg (183 lb 6.4 oz)       Physical Exam    General: General malaise  HEENT: NCAT, EOMI, normal conjunctiva  Cardiovascular: RRR, normal S1 and S2, no m/r/g  Pulmonary: Normal respiratory effort, no wheezes, rales or rhonchi  Extremities: No lower extremity edema  Skin: Normal skin color, no rashes     Axel Cowart MD

## 2024-02-16 ENCOUNTER — TELEPHONE (OUTPATIENT)
Age: 49
End: 2024-02-16

## 2024-02-16 NOTE — LETTER
February 16, 2024     Patient: Paulette Borden  YOB: 1975  Date of Visit: 2/16/2024      To Whom it May Concern:    Paulette Borden is under my professional care. Paulette was seen in my office on 2/14/2024. Paulette may return to work on 2/20/24 .    If you have any questions or concerns, please don't hesitate to call.         Sincerely,          Axel Cowart MD

## 2024-02-16 NOTE — TELEPHONE ENCOUNTER
Patient called and would like to have her work note extended until Tues 2/20. Please have work note ready for her son to  this afternoon.

## 2024-03-18 DIAGNOSIS — E66.01 SEVERE OBESITY WITH BODY MASS INDEX (BMI) OF 35.0 TO 39.9 WITH SERIOUS COMORBIDITY (HCC): Chronic | ICD-10-CM

## 2024-03-19 RX ORDER — SEMAGLUTIDE 2.4 MG/.75ML
2.4 INJECTION, SOLUTION SUBCUTANEOUS WEEKLY
Qty: 3 ML | Refills: 2 | Status: SHIPPED | OUTPATIENT
Start: 2024-03-19

## 2024-04-03 ENCOUNTER — TELEPHONE (OUTPATIENT)
Dept: INTERNAL MEDICINE CLINIC | Facility: CLINIC | Age: 49
End: 2024-04-03

## 2024-04-03 NOTE — TELEPHONE ENCOUNTER
Patients  stopped in patient is now taking care of her mother due to health concerns. Needs FMLA form filled out for work.  Blank scanned into media and placed into Dr Reyes bin.    Call  (Amandeep) when done, Cell [phone]

## 2024-05-07 ENCOUNTER — HOSPITAL ENCOUNTER (OUTPATIENT)
Dept: MAMMOGRAPHY | Facility: HOSPITAL | Age: 49
Discharge: HOME | End: 2024-05-07
Payer: COMMERCIAL

## 2024-05-07 VITALS — HEIGHT: 62 IN | BODY MASS INDEX: 34.04 KG/M2 | WEIGHT: 184.97 LBS

## 2024-05-07 DIAGNOSIS — Z12.31 BREAST CANCER SCREENING BY MAMMOGRAM: ICD-10-CM

## 2024-05-07 PROCEDURE — 77067 SCR MAMMO BI INCL CAD: CPT

## 2024-05-07 PROCEDURE — 77063 BREAST TOMOSYNTHESIS BI: CPT

## 2024-05-09 DIAGNOSIS — D68.51 FACTOR V LEIDEN MUTATION (HCC): ICD-10-CM

## 2024-05-09 RX ORDER — RIVAROXABAN 20 MG/1
20 TABLET, FILM COATED ORAL DAILY
Qty: 90 TABLET | Refills: 1 | Status: SHIPPED | OUTPATIENT
Start: 2024-05-09

## 2024-05-21 DIAGNOSIS — D68.51 FACTOR V LEIDEN MUTATION (HCC): ICD-10-CM

## 2024-05-21 RX ORDER — RIVAROXABAN 20 MG/1
20 TABLET, FILM COATED ORAL DAILY
Qty: 90 TABLET | Refills: 1 | Status: SHIPPED | OUTPATIENT
Start: 2024-05-21

## 2024-06-04 ENCOUNTER — NURSE TRIAGE (OUTPATIENT)
Age: 49
End: 2024-06-04

## 2024-06-04 DIAGNOSIS — B37.31 YEAST VAGINITIS: Primary | ICD-10-CM

## 2024-06-04 NOTE — TELEPHONE ENCOUNTER
"Pt reports white thick vaginal discharge, itching and soreness that started on 5/31/24. Tried monistat yesterday with no relief. LMP about 3 weeks ago. Pt wondering if a prescription can be sent to her [facility] pharmacy.      Reason for Disposition   Symptoms of a vaginal yeast infection (i.e., white, thick, cottage-cheese-like, itchy, not bad smelling discharge)    Answer Assessment - Initial Assessment Questions  1. DISCHARGE: \"Describe the discharge.\" (e.g., white, yellow, green, gray, foamy, cottage cheese-like)      Thick white  2. ODOR: \"Is there a bad odor?\"      denies  3. ONSET: \"When did the discharge begin?\"      5/31/24  4. RASH: \"Is there a rash in that area?\" If Yes, ask: \"Describe it.\" (e.g., redness, blisters, sores, bumps)      denies  5. ABDOMINAL PAIN: \"Are you having any abdominal pain?\" If Yes, ask: \"What does it feel like? \" (e.g., crampy, dull, intermittent, constant)       denies  6. ABDOMINAL PAIN SEVERITY: If present, ask: \"How bad is it?\"  (e.g., mild, moderate, severe)   - MILD - doesn't interfere with normal activities    - MODERATE - interferes with normal activities or awakens from sleep    - SEVERE - patient doesn't want to move (R/O peritonitis)       denies  7. CAUSE: \"What do you think is causing the discharge?\" \"Have you had the same problem before? What happened then?\"      Yeast infection  8. OTHER SYMPTOMS: \"Do you have any other symptoms?\" (e.g., fever, itching, vaginal bleeding, pain with urination, injury to genital area, vaginal foreign body)      denies  9. PREGNANCY: \"Is there any chance you are pregnant?\" \"When was your last menstrual period?\"      LMP about 3 weeks ago    Protocols used: Vaginal Discharge-ADULT-OH    "

## 2024-06-04 NOTE — TELEPHONE ENCOUNTER
Regarding: yeast infection  ----- Message from Lucy RED sent at 6/4/2024  4:55 PM EDT -----  Patient calling with symptoms of yeast infection, discharge and irritation. Patient used monistat but it didn't help much.    Tried CTS.

## 2024-06-05 RX ORDER — FLUCONAZOLE 150 MG/1
TABLET ORAL
Qty: 2 TABLET | Refills: 0 | Status: SHIPPED | OUTPATIENT
Start: 2024-06-05 | End: 2024-06-09

## 2024-06-07 NOTE — TELEPHONE ENCOUNTER
Placed call to pt. Pt aware prescription was sent. Pt took 1st dose and will be taking 2nd dose tomorrow. States already feels an improvement with symptoms. No further questions.

## 2024-06-11 DIAGNOSIS — E66.01 SEVERE OBESITY WITH BODY MASS INDEX (BMI) OF 35.0 TO 39.9 WITH SERIOUS COMORBIDITY (HCC): Chronic | ICD-10-CM

## 2024-06-11 RX ORDER — SEMAGLUTIDE 2.4 MG/.75ML
2.4 INJECTION, SOLUTION SUBCUTANEOUS WEEKLY
Qty: 3 ML | Refills: 2 | Status: SHIPPED | OUTPATIENT
Start: 2024-06-11

## 2024-07-02 ENCOUNTER — TELEPHONE (OUTPATIENT)
Dept: INTERNAL MEDICINE CLINIC | Facility: CLINIC | Age: 49
End: 2024-07-02

## 2024-09-02 DIAGNOSIS — E66.01 SEVERE OBESITY WITH BODY MASS INDEX (BMI) OF 35.0 TO 39.9 WITH SERIOUS COMORBIDITY (HCC): Chronic | ICD-10-CM

## 2024-09-03 RX ORDER — SEMAGLUTIDE 2.4 MG/.75ML
2.4 INJECTION, SOLUTION SUBCUTANEOUS WEEKLY
Qty: 3 ML | Refills: 2 | Status: SHIPPED | OUTPATIENT
Start: 2024-09-03

## 2024-10-08 ENCOUNTER — OFFICE VISIT (OUTPATIENT)
Dept: INTERNAL MEDICINE CLINIC | Facility: CLINIC | Age: 49
End: 2024-10-08
Payer: COMMERCIAL

## 2024-10-08 VITALS
OXYGEN SATURATION: 98 % | DIASTOLIC BLOOD PRESSURE: 82 MMHG | RESPIRATION RATE: 18 BRPM | BODY MASS INDEX: 27.23 KG/M2 | SYSTOLIC BLOOD PRESSURE: 118 MMHG | HEIGHT: 62 IN | WEIGHT: 148 LBS

## 2024-10-08 DIAGNOSIS — Z12.31 BREAST CANCER SCREENING BY MAMMOGRAM: ICD-10-CM

## 2024-10-08 DIAGNOSIS — Z00.00 ANNUAL PHYSICAL EXAM: Primary | ICD-10-CM

## 2024-10-08 DIAGNOSIS — D68.51 FACTOR V LEIDEN MUTATION (HCC): ICD-10-CM

## 2024-10-08 DIAGNOSIS — I26.99 OTHER PULMONARY EMBOLISM WITHOUT ACUTE COR PULMONALE, UNSPECIFIED CHRONICITY (HCC): ICD-10-CM

## 2024-10-08 DIAGNOSIS — E78.00 PURE HYPERCHOLESTEROLEMIA: Chronic | ICD-10-CM

## 2024-10-08 DIAGNOSIS — E66.01 SEVERE OBESITY WITH BODY MASS INDEX (BMI) OF 35.0 TO 39.9 WITH SERIOUS COMORBIDITY (HCC): Chronic | ICD-10-CM

## 2024-10-08 PROBLEM — R68.89 FLU-LIKE SYMPTOMS: Status: RESOLVED | Noted: 2024-02-14 | Resolved: 2024-10-08

## 2024-10-08 PROCEDURE — 99396 PREV VISIT EST AGE 40-64: CPT | Performed by: INTERNAL MEDICINE

## 2024-10-08 RX ORDER — RIVAROXABAN 20 MG/1
20 TABLET, FILM COATED ORAL DAILY
Qty: 90 TABLET | Refills: 3 | Status: SHIPPED | OUTPATIENT
Start: 2024-10-08

## 2024-10-08 NOTE — ASSESSMENT & PLAN NOTE
Maintained on Xarelto  Orders:    CBC and differential    Comprehensive metabolic panel    Xarelto 20 MG tablet; Take 1 tablet (20 mg total) by mouth daily

## 2024-10-08 NOTE — PATIENT INSTRUCTIONS
"Patient Education     Routine physical for adults   The Basics   Written by the doctors and editors at [facility]   What is a physical? -- A physical is a routine visit, or \"check-up,\" with your doctor. You might also hear it called a \"wellness visit\" or \"preventive visit.\"  During each visit, the doctor will:   Ask about your physical and mental health   Ask about your habits, behaviors, and lifestyle   Do an exam   Give you vaccines if needed   Talk to you about any medicines you take   Give advice about your health   Answer your questions  Getting regular check-ups is an important part of taking care of your health. It can help your doctor find and treat any problems you have. But it's also important for preventing health problems.  A routine physical is different from a \"sick visit.\" A sick visit is when you see a doctor because of a health concern or problem. Since physicals are scheduled ahead of time, you can think about what you want to ask the doctor.  How often should I get a physical? -- It depends on your age and health. In general, for people age 21 years and older:   If you are younger than 50 years, you might be able to get a physical every 3 years.   If you are 50 years or older, your doctor might recommend a physical every year.  If you have an ongoing health condition, like diabetes or high blood pressure, your doctor will probably want to see you more often.  What happens during a physical? -- In general, each visit will include:   Physical exam - The doctor or nurse will check your height, weight, heart rate, and blood pressure. They will also look at your eyes and ears. They will ask about how you are feeling and whether you have any symptoms that bother you.   Medicines - It's a good idea to bring a list of all the medicines you take to each doctor visit. Your doctor will talk to you about your medicines and answer any questions. Tell them if you are having any side effects that bother you. You " "should also tell them if you are having trouble paying for any of your medicines.   Habits and behaviors - This includes:   Your diet   Your exercise habits   Whether you smoke, drink alcohol, or use drugs   Whether you are sexually active   Whether you feel safe at home  Your doctor will talk to you about things you can do to improve your health and lower your risk of health problems. They will also offer help and support. For example, if you want to quit smoking, they can give you advice and might prescribe medicines. If you want to improve your diet or get more physical activity, they can help you with this, too.   Lab tests, if needed - The tests you get will depend on your age and situation. For example, your doctor might want to check your:   Cholesterol   Blood sugar   Iron level   Vaccines - The recommended vaccines will depend on your age, health, and what vaccines you already had. Vaccines are very important because they can prevent certain serious or deadly infections.   Discussion of screening - \"Screening\" means checking for diseases or other health problems before they cause symptoms. Your doctor can recommend screening based on your age, risk, and preferences. This might include tests to check for:   Cancer, such as breast, prostate, cervical, ovarian, colorectal, prostate, lung, or skin cancer   Sexually transmitted infections, such as chlamydia and gonorrhea   Mental health conditions like depression and anxiety  Your doctor will talk to you about the different types of screening tests. They can help you decide which screenings to have. They can also explain what the results might mean.   Answering questions - The physical is a good time to ask the doctor or nurse questions about your health. If needed, they can refer you to other doctors or specialists, too.  Adults older than 65 years often need other care, too. As you get older, your doctor will talk to you about:   How to prevent falling at " home   Hearing or vision tests   Memory testing   How to take your medicines safely   Making sure that you have the help and support you need at home  All topics are updated as new evidence becomes available and our peer review process is complete.  This topic retrieved from [employer] on: May 02, 2024.  Topic 453082 Version 1.0  Release: 32.4.3 - C32.122  © 2024 UpToDate, Inc. and/or its affiliates. All rights reserved.  Consumer Information Use and Disclaimer   Disclaimer: This generalized information is a limited summary of diagnosis, treatment, and/or medication information. It is not meant to be comprehensive and should be used as a tool to help the user understand and/or assess potential diagnostic and treatment options. It does NOT include all information about conditions, treatments, medications, side effects, or risks that may apply to a specific patient. It is not intended to be medical advice or a substitute for the medical advice, diagnosis, or treatment of a health care provider based on the health care provider's examination and assessment of a patient's specific and unique circumstances. Patients must speak with a health care provider for complete information about their health, medical questions, and treatment options, including any risks or benefits regarding use of medications. This information does not endorse any treatments or medications as safe, effective, or approved for treating a specific patient. UpToDate, Inc. and its affiliates disclaim any warranty or liability relating to this information or the use thereof.The use of this information is governed by the Terms of Use, available at https://www.woltersXAircraftuwer.com/en/know/clinical-effectiveness-terms. 2024© UpToDate, Inc. and its affiliates and/or licensors. All rights reserved.  Copyright   © 2024 UpToDate, Inc. and/or its affiliates. All rights reserved.

## 2024-10-08 NOTE — ASSESSMENT & PLAN NOTE
Expect improvement of lipids with more weight loss in the past year from Wegovy  Orders:    CBC and differential    Comprehensive metabolic panel    Lipid panel    CBC and differential; Future    Comprehensive metabolic panel; Future    Lipid panel; Future    CBC and differential    Comprehensive metabolic panel    Lipid panel

## 2024-10-08 NOTE — PROGRESS NOTES
Adult Annual Physical  Name: Paulette Borden      : 1975      MRN: [id number]  Encounter Provider: Lea Reyes, MD  Encounter Date: 10/8/2024   Enc[facility] department: [facility]    Assessment & Plan  Annual physical exam         Factor V Leiden mutation (HCC)  Maintained on Xarelto  Orders:    CBC and differential    Comprehensive metabolic panel    Xarelto 20 MG tablet; Take 1 tablet (20 mg total) by mouth daily    Pure hypercholesterolemia  Expect improvement of lipids with more weight loss in the past year from Wegovy  Orders:    CBC and differential    Comprehensive metabolic panel    Lipid panel    CBC and differential; Future    Comprehensive metabolic panel; Future    Lipid panel; Future    CBC and differential    Comprehensive metabolic panel    Lipid panel    Breast cancer screening by mammogram    Orders:    Mammo screening bilateral w 3d and cad    Other pulmonary embolism without acute cor pulmonale, unspecified chronicity (HCC)  Maintained on Xarelto       Severe obesity (BMI 35.0-39.9)  Has been on Wegovy for more than 1 year and doing well         Immunizations and preventive care screenings were discussed with patient today. Appropriate education was printed on patient's after visit summary.    Counseling:  Exercise: the importance of regular exercise/physical activity was discussed. Recommend exercise 3-5 times per week for at least 30 minutes.          History of Present Illness     Adult Annual Physical:  Patient presents for annual physical.     Diet and Physical Activity:  - Diet/Nutrition: well balanced diet. small portions on Wegovy  - Exercise: no formal exercise.    Depression Screening:  - PHQ-2 Score: 2    General Health:  - Sleep: sleeps poorly.  - Hearing: normal hearing bilateral ears.  - Vision: goes for regular eye exams.  - Dental: regular dental visits.    /GYN Health:  - Follows with GYN: yes.   - Menopause: premenopausal.     Review of Systems  "  Constitutional:  Negative for unexpected weight change.   Respiratory:  Negative for shortness of breath.    Cardiovascular:  Negative for chest pain, palpitations and leg swelling.   Gastrointestinal:  Negative for abdominal pain, constipation and diarrhea.   Genitourinary:  Negative for menstrual problem.   Neurological:  Negative for dizziness and headaches.         Objective     /82 (BP Location: Left arm, Patient Position: Sitting, Cuff Size: Standard)   Resp 18   Ht 5' 2\" (1.575 m)   Wt 67.1 kg (148 lb)   SpO2 98%   BMI 27.07 kg/m²     Physical Exam  Constitutional:       General: She is not in acute distress.     Appearance: She is well-developed. She is not ill-appearing, toxic-appearing or diaphoretic.   HENT:      Right Ear: External ear normal. There is no impacted cerumen.      Left Ear: External ear normal. There is no impacted cerumen.   Eyes:      Conjunctiva/sclera: Conjunctivae normal.   Cardiovascular:      Rate and Rhythm: Normal rate and regular rhythm.      Heart sounds: Normal heart sounds. No murmur heard.  Pulmonary:      Effort: Pulmonary effort is normal. No respiratory distress.      Breath sounds: Normal breath sounds. No stridor. No wheezing or rales.   Abdominal:      General: There is no distension.      Palpations: Abdomen is soft. There is no mass.      Tenderness: There is no abdominal tenderness. There is no guarding or rebound.   Musculoskeletal:      Cervical back: Neck supple.      Right lower leg: No edema.      Left lower leg: No edema.   Neurological:      Mental Status: She is alert and oriented to person, place, and time.   Psychiatric:         Mood and Affect: Mood normal.         Behavior: Behavior normal.         Thought Content: Thought content normal.         Judgment: Judgment normal.         "

## 2024-10-16 LAB
ALBUMIN SERPL-MCNC: 4.2 G/DL (ref 3.6–5.1)
ALBUMIN/GLOB SERPL: 1.6 (CALC) (ref 1–2.5)
ALP SERPL-CCNC: 52 U/L (ref 31–125)
ALT SERPL-CCNC: 12 U/L (ref 6–29)
AST SERPL-CCNC: 13 U/L (ref 10–35)
BASOPHILS # BLD AUTO: 29 CELLS/UL (ref 0–200)
BASOPHILS NFR BLD AUTO: 0.5 %
BILIRUB SERPL-MCNC: 0.6 MG/DL (ref 0.2–1.2)
BUN SERPL-MCNC: 10 MG/DL (ref 7–25)
BUN/CREAT SERPL: NORMAL (CALC) (ref 6–22)
CALCIUM SERPL-MCNC: 9.8 MG/DL (ref 8.6–10.2)
CHLORIDE SERPL-SCNC: 103 MMOL/L (ref 98–110)
CHOLEST SERPL-MCNC: 187 MG/DL
CHOLEST/HDLC SERPL: 3.2 (CALC)
CO2 SERPL-SCNC: 31 MMOL/L (ref 20–32)
CREAT SERPL-MCNC: 0.68 MG/DL (ref 0.5–0.99)
EOSINOPHIL # BLD AUTO: 128 CELLS/UL (ref 15–500)
EOSINOPHIL NFR BLD AUTO: 2.2 %
ERYTHROCYTE [DISTWIDTH] IN BLOOD BY AUTOMATED COUNT: 11.7 % (ref 11–15)
GFR/BSA.PRED SERPLBLD CYS-BASED-ARV: 107 ML/MIN/1.73M2
GLOBULIN SER CALC-MCNC: 2.7 G/DL (CALC) (ref 1.9–3.7)
GLUCOSE SERPL-MCNC: 90 MG/DL (ref 65–99)
HCT VFR BLD AUTO: 42.2 % (ref 35–45)
HDLC SERPL-MCNC: 59 MG/DL
HGB BLD-MCNC: 13.7 G/DL (ref 11.7–15.5)
LDLC SERPL CALC-MCNC: 115 MG/DL (CALC)
LYMPHOCYTES # BLD AUTO: 1960 CELLS/UL (ref 850–3900)
LYMPHOCYTES NFR BLD AUTO: 33.8 %
MCH RBC QN AUTO: 31.4 PG (ref 27–33)
MCHC RBC AUTO-ENTMCNC: 32.5 G/DL (ref 32–36)
MCV RBC AUTO: 96.8 FL (ref 80–100)
MONOCYTES # BLD AUTO: 447 CELLS/UL (ref 200–950)
MONOCYTES NFR BLD AUTO: 7.7 %
NEUTROPHILS # BLD AUTO: 3236 CELLS/UL (ref 1500–7800)
NEUTROPHILS NFR BLD AUTO: 55.8 %
NONHDLC SERPL-MCNC: 128 MG/DL (CALC)
PLATELET # BLD AUTO: 323 THOUSAND/UL (ref 140–400)
PMV BLD REES-ECKER: 9.5 FL (ref 7.5–12.5)
POTASSIUM SERPL-SCNC: 4.7 MMOL/L (ref 3.5–5.3)
PROT SERPL-MCNC: 6.9 G/DL (ref 6.1–8.1)
RBC # BLD AUTO: 4.36 MILLION/UL (ref 3.8–5.1)
SODIUM SERPL-SCNC: 140 MMOL/L (ref 135–146)
TRIGL SERPL-MCNC: 52 MG/DL
WBC # BLD AUTO: 5.8 THOUSAND/UL (ref 3.8–10.8)

## 2024-11-08 ENCOUNTER — ANNUAL EXAM (OUTPATIENT)
Dept: OBGYN CLINIC | Facility: MEDICAL CENTER | Age: 49
End: 2024-11-08

## 2024-11-08 VITALS
BODY MASS INDEX: 26.87 KG/M2 | HEIGHT: 62 IN | SYSTOLIC BLOOD PRESSURE: 102 MMHG | DIASTOLIC BLOOD PRESSURE: 66 MMHG | WEIGHT: 146 LBS

## 2024-11-08 DIAGNOSIS — N64.59 ABNORMAL BREAST EXAM: ICD-10-CM

## 2024-11-08 DIAGNOSIS — Z12.31 ENCOUNTER FOR SCREENING MAMMOGRAM FOR BREAST CANCER: ICD-10-CM

## 2024-11-08 DIAGNOSIS — Z01.419 ENCOUNTER FOR GYNECOLOGICAL EXAMINATION (GENERAL) (ROUTINE) WITHOUT ABNORMAL FINDINGS: Primary | ICD-10-CM

## 2024-11-08 NOTE — PROGRESS NOTES
Ambulatory Visit  Name: Paulette Borden      : 1975      MRN: [id number]  Encounter Provider: LIS Cruz  Encounter Date: 2024   Encounter department: [facility] [place]    Assessment & Plan  Encounter for gynecological examination (general) (routine) without abnormal findings  Annual GYN examination completed today.   Health maintenance reviewed/updated as appropriate  Risk prevention and anticipatory guidance provided including:  Encouraged regular self breast exams and to call with changes   Age related Calcium and vitamin D intake  Encouraged weight based exercises  Dietary and lifestyle recommendations based on her age and weight.       Encounter for screening mammogram for breast cancer  Patient to continue to perform self breast exams and notify us if she notices any changes.    Reviewed the signs of breast cancer.       Abnormal breast exam  questionable mass vs ridge of dense breast tissue bilateral.     Bilateral diagnostic U/S and mammogram ordered    Orders:    Mammo diagnostic bilateral w 3d and cad; Future    US breast left limited (diagnostic); Future    US breast right limited (diagnostic); Future        Subjective:      History of Present Illness     Paulette Borden is a 49 y.o. female who presents for her annual exam    Patient denies menstrual complaints. Regular monthly menses, not excessive  She denies any C/O abnormal vaginal discharge or irritation. Denies Urinary complaints or breast changes    Sexually active: yes  Monogamous/single partner  Denies C/O related to intimacy/sexual activity  Contraception:  has vasectomy  LMP: 10/31/24     She reports she feels safe at home.   Lifestyle/exercise: exercises at the gym. Encouraged 150 min/week  Dietary calcium/vit D  intake: Patient does not take any supplementation. Patient was encouraged to increase calcium/vitamin D supplementation     HEALTH MAINTENANCE SCREENINGS:      Previous pap smears and ASCCP screening guidelines have been reviewed.   Last Pap:  10/31/2023; Next due 2028  History of abnormal pap: No,   Last mammogram:  05/07/2024  Last Colon Cancer Screening: colonoscopy: 06/02/2021 Cologuard:Not on file. Recall 2026    Hereditary Cancer Screening  FH Breast/Ovarian cancer: None  FH Uterine cancer: None  FH Colon cancer: Father (66)  Cancer-related family history includes Colon cancer (age of onset: 66) in her father. There is no history of Breast cancer or Ovarian cancer.      Substance Abuse Screening Completed. See hx and flowsheet.    Review of Systems   Constitutional:  Negative for appetite change, chills, fatigue, fever and unexpected weight change.   HENT: Negative.     Eyes: Negative.  Negative for visual disturbance.   Respiratory:  Negative for chest tightness and shortness of breath.    Cardiovascular:  Negative for chest pain and palpitations.   Gastrointestinal:  Negative for abdominal pain, anal bleeding, blood in stool, constipation, diarrhea, nausea and vomiting.   Endocrine: Negative for cold intolerance and heat intolerance.   Genitourinary:  Negative for difficulty urinating, dyspareunia, dysuria, frequency, hematuria, menstrual problem, pelvic pain, urgency, vaginal bleeding, vaginal discharge and vaginal pain.   Musculoskeletal:  Negative for back pain, joint swelling and neck pain.   Skin:  Negative for color change and rash.   Neurological:  Negative for dizziness, syncope, weakness, light-headedness, numbness and headaches.   Hematological:  Does not bruise/bleed easily.   Psychiatric/Behavioral: Negative.  Negative for dysphoric mood. The patient is not nervous/anxious.        Current Outpatient Medications on File Prior to Visit   Medication Sig Dispense Refill    clobetasol (TEMOVATE) 0.05 % cream Apply topically 2 (two) times a day 60 g 0    fluticasone (FLONASE) 50 mcg/act nasal spray 1 SPRAY INTO EACH NOSTRIL 2 (TWO) TIMES A DAY AS NEEDED FOR  RHINITIS OR ALLERGIES 48 mL 1    NovoFine Plus Pen Needle 32G X 4 MM MISC USE IN THE MORNING 100 each 1    Wegovy 2.4 MG/0.75ML INJECT 0.75 ML (2.4 MG TOTAL) UNDER THE SKIN ONCE A WEEK 3 mL 2    Xarelto 20 MG tablet Take 1 tablet (20 mg total) by mouth daily 90 tablet 3    EPINEPHrine (EPIPEN) 0.3 mg/0.3 mL SOAJ INJECT INTO THIGH AS NEEDED (Patient not taking: Reported on 11/8/2024) 2 each 0     No current facility-administered medications on file prior to visit.      Social History     Tobacco Use    Smoking status: Never    Smokeless tobacco: Never   Vaping Use    Vaping status: Never Used   Substance and Sexual Activity    Alcohol use: Yes     Comment: social     Drug use: No    Sexual activity: Yes     Partners: Male     Birth control/protection: Male Sterilization, None       The following portions of the patient's history were reviewed and updated as appropriate: allergies, current medications, past family history, past medical history, past social history, past surgical history, and problem list.          Objective:  There were no vitals taken for this visit.     Objective     There were no vitals taken for this visit.    Physical Exam  Constitutional:       General: She is not in acute distress.     Appearance: Normal appearance.   Genitourinary:      Vulva and rectum normal.      No lesions in the vagina.      Right Labia: No rash or lesions.     Left Labia: No lesions or rash.     No vaginal discharge, erythema, tenderness or bleeding.        Right Adnexa: not tender and no mass present.     Left Adnexa: not tender and no mass present.     No cervical motion tenderness, discharge or friability.      Uterus is not enlarged or tender.      No urethral prolapse present.      Pelvic exam was performed with patient in the lithotomy position.   Breasts:     Breasts are symmetrical.      Right: No swelling, bleeding, inverted nipple, nipple discharge, skin change or tenderness.      Left: No swelling, bleeding,  inverted nipple, nipple discharge, skin change or tenderness.   HENT:      Head: Normocephalic and atraumatic.   Cardiovascular:      Rate and Rhythm: Normal rate.      Heart sounds: No murmur heard.  Pulmonary:      Effort: Pulmonary effort is normal.      Breath sounds: Normal breath sounds.   Chest:          Comments: questionable mass vs ridge of dense breast tissue bilateral  Abdominal:      General: There is no distension.      Palpations: Abdomen is soft.      Tenderness: There is no abdominal tenderness.   Musculoskeletal:         General: Normal range of motion.      Cervical back: Normal range of motion.   Lymphadenopathy:      Cervical: No cervical adenopathy.      Upper Body:      Right upper body: No supraclavicular or axillary adenopathy.      Left upper body: No supraclavicular or axillary adenopathy.   Neurological:      Mental Status: She is alert and oriented to person, place, and time.   Skin:     General: Skin is warm and dry.   Psychiatric:         Mood and Affect: Mood normal.         Behavior: Behavior normal.   Vitals reviewed.           Sharon Campos PA-C

## 2024-11-29 DIAGNOSIS — E66.01 SEVERE OBESITY WITH BODY MASS INDEX (BMI) OF 35.0 TO 39.9 WITH SERIOUS COMORBIDITY (HCC): Chronic | ICD-10-CM

## 2024-11-29 RX ORDER — SEMAGLUTIDE 2.4 MG/.75ML
2.4 INJECTION, SOLUTION SUBCUTANEOUS WEEKLY
Qty: 3 ML | Refills: 0 | Status: SHIPPED | OUTPATIENT
Start: 2024-11-29

## 2024-11-29 NOTE — TELEPHONE ENCOUNTER
Reason for call:   [x] Refill   [] Prior Auth  [] Other:     Office:   [x] PCP/Provider - [facility] - Lea Reyes, MD   [] Specialty/Provider -     Medication: Wegovy 2.4 MG/0.75ML     Dose/Frequency: INJECT 0.75 ML (2.4 MG TOTAL) UNDER THE SKIN ONCE A WEEK     Quantity: 3 mL    Pharmacy: [facility]/pharmacy #[id number]  [place] - [address] [facility] [address] [phone]    Does the patient have enough for 3 days?   [x] Yes   [] No - Send as HP to POD

## 2024-12-11 ENCOUNTER — TELEPHONE (OUTPATIENT)
Dept: INTERNAL MEDICINE CLINIC | Facility: CLINIC | Age: 49
End: 2024-12-11

## 2024-12-12 NOTE — TELEPHONE ENCOUNTER
PA for WEGOVY 2.4 MG/0.75 ML SUBMITTED to [facility]    via    []CMM-KEY:   []Surescripts-Case ID #   []Availity-Auth ID # NDC #   [x]Faxed to plan [phone]  []Other website   []Phone call Case ID #     []PA sent as URGENT    All office notes, labs and other pertaining documents and studies sent. Clinical questions answered. Awaiting determination from insurance company.     Turnaround time for your insurance to make a decision on your Prior Authorization can take 7-21 business days.

## 2024-12-13 NOTE — TELEPHONE ENCOUNTER
PA for Wegovy 2.4 mg/0.75 ml  APPROVED     Date(s) approved 12/12/24-12/12/25    Case #[id number]    Patient advised by          []MyChart Message  []Phone call   [x]LMOM  []L/M to call office as no active Communication consent on file  []Unable to leave detailed message as VM not approved on Communication consent       Pharmacy advised by    [x]Fax  []Phone call    Approval letter scanned into [employer] Yes

## 2024-12-29 DIAGNOSIS — E66.01 SEVERE OBESITY WITH BODY MASS INDEX (BMI) OF 35.0 TO 39.9 WITH SERIOUS COMORBIDITY (HCC): Chronic | ICD-10-CM

## 2024-12-31 RX ORDER — SEMAGLUTIDE 2.4 MG/.75ML
2.4 INJECTION, SOLUTION SUBCUTANEOUS WEEKLY
Qty: 3 ML | Refills: 11 | Status: SHIPPED | OUTPATIENT
Start: 2024-12-31

## 2025-01-23 ENCOUNTER — HOSPITAL ENCOUNTER (OUTPATIENT)
Dept: ULTRASOUND IMAGING | Facility: CLINIC | Age: 50
Discharge: HOME | End: 2025-01-23
Payer: COMMERCIAL

## 2025-01-23 ENCOUNTER — RESULTS FOLLOW-UP (OUTPATIENT)
Age: 50
End: 2025-01-23

## 2025-01-23 ENCOUNTER — HOSPITAL ENCOUNTER (OUTPATIENT)
Dept: MAMMOGRAPHY | Facility: CLINIC | Age: 50
Discharge: HOME | End: 2025-01-23
Payer: COMMERCIAL

## 2025-01-23 VITALS — WEIGHT: 146 LBS | BODY MASS INDEX: 26.87 KG/M2 | HEIGHT: 62 IN

## 2025-01-23 DIAGNOSIS — N64.59 ABNORMAL BREAST EXAM: ICD-10-CM

## 2025-01-23 PROCEDURE — G0279 TOMOSYNTHESIS, MAMMO: HCPCS

## 2025-01-23 PROCEDURE — 77066 DX MAMMO INCL CAD BI: CPT

## 2025-01-23 PROCEDURE — 76642 ULTRASOUND BREAST LIMITED: CPT

## 2025-03-05 ENCOUNTER — TELEPHONE (OUTPATIENT)
Age: 50
End: 2025-03-05

## 2025-03-05 NOTE — TELEPHONE ENCOUNTER
Patient called, disputing mammogram from 1/23/25. Patient was not aware script was for diagnostic vs screening. Attempted to explain script placed from 11/8/24 annual exam, ov notes state 'questionable mass vs ridge of dense breast tissue bilateral' indicating diagnostic.     Pt requesting Candi John to review & please reach out.

## 2025-03-06 NOTE — TELEPHONE ENCOUNTER
I am typically pretty specific in explaining that we'd do a mammo and a breast US to evaluate if anything is abnormal on the breast exam and I discuss how she would have to go to a regional breast center and not routine radiology b/c there would be a radiologist at the breast center who reviews the images prior to the patient leaving .  I don't know if there's anything I can't change.- I used a dx code of abnormal breast exam- the only thing I could think to add would be breast mass.

## 2025-05-27 DIAGNOSIS — D68.51 FACTOR V LEIDEN MUTATION (HCC): ICD-10-CM

## 2025-05-27 RX ORDER — RIVAROXABAN 20 MG/1
20 TABLET, FILM COATED ORAL DAILY
Qty: 30 TABLET | Refills: 2 | Status: SHIPPED | OUTPATIENT
Start: 2025-05-27

## 2025-07-03 DIAGNOSIS — T63.444S ANAPHYLACTIC REACTION TO BEE STING, UNDETERMINED INTENT, SEQUELA: ICD-10-CM

## 2025-07-03 DIAGNOSIS — T78.2XXS ANAPHYLACTIC REACTION TO BEE STING, UNDETERMINED INTENT, SEQUELA: ICD-10-CM

## 2025-07-03 RX ORDER — EPINEPHRINE 0.3 MG/.3ML
INJECTION SUBCUTANEOUS
Qty: 2 EACH | Refills: 0 | Status: SHIPPED | OUTPATIENT
Start: 2025-07-03

## 2025-07-03 NOTE — TELEPHONE ENCOUNTER
Reason for call:   [x] Refill   [] Prior Auth  [] Other:     Office:   [x] PCP/Provider - ZACK BERRIOS   [] Specialty/Provider -     Medication: EPINEPHrine (EPIPEN) 0.3 mg/0.3 mL SOAJ INJECT INTO THIGH AS NEEDED     Quantity: 2 each     Pharmacy: [facility]/pharmacy #[id number] - [place]ALK[place] - [address] [facility]   Does the patient have enough for 3 days?   [] Yes   [x] No - Send as HP to POD Patient stated that prescription has      Mail Away Pharmacy   Does the patient have enough for 10 days?   [] Yes   [] No - Send as HP to POD